# Patient Record
Sex: MALE | Race: WHITE | NOT HISPANIC OR LATINO | Employment: FULL TIME | URBAN - METROPOLITAN AREA
[De-identification: names, ages, dates, MRNs, and addresses within clinical notes are randomized per-mention and may not be internally consistent; named-entity substitution may affect disease eponyms.]

---

## 2017-01-26 RX ORDER — IBUPROFEN 800 MG/1
TABLET, FILM COATED ORAL
Qty: 90 TABLET | Refills: 3 | OUTPATIENT
Start: 2017-01-26

## 2017-01-27 RX ORDER — CARBAMAZEPINE 200 MG
TABLET ORAL
Qty: 720 TABLET | Refills: 0 | OUTPATIENT
Start: 2017-01-27

## 2017-01-27 RX ORDER — PRIMIDONE 250 MG/1
TABLET ORAL
Qty: 120 TABLET | Refills: 2 | OUTPATIENT
Start: 2017-01-27

## 2017-01-30 DIAGNOSIS — G40.209 PARTIAL SYMPTOMATIC EPILEPSY WITH COMPLEX PARTIAL SEIZURES, NOT INTRACTABLE, WITHOUT STATUS EPILEPTICUS (H): Primary | ICD-10-CM

## 2017-01-31 RX ORDER — PRIMIDONE 250 MG/1
TABLET ORAL
Qty: 120 TABLET | Refills: 2 | Status: SHIPPED | OUTPATIENT
Start: 2017-01-31 | End: 2017-04-29

## 2017-01-31 RX ORDER — CARBAMAZEPINE 200 MG/1
TABLET ORAL
Qty: 240 TABLET | Refills: 2 | Status: SHIPPED | OUTPATIENT
Start: 2017-01-31 | End: 2017-05-01

## 2017-02-02 RX ORDER — IBUPROFEN 800 MG/1
TABLET, FILM COATED ORAL
Qty: 90 TABLET | Refills: 3 | OUTPATIENT
Start: 2017-02-02

## 2017-04-28 DIAGNOSIS — G40.209 PARTIAL SYMPTOMATIC EPILEPSY WITH COMPLEX PARTIAL SEIZURES, NOT INTRACTABLE, WITHOUT STATUS EPILEPTICUS (H): ICD-10-CM

## 2017-04-28 RX ORDER — CARBAMAZEPINE 200 MG/1
TABLET ORAL
Qty: 240 TABLET | Refills: 2 | OUTPATIENT
Start: 2017-04-28

## 2017-04-29 DIAGNOSIS — G40.209 PARTIAL SYMPTOMATIC EPILEPSY WITH COMPLEX PARTIAL SEIZURES, NOT INTRACTABLE, WITHOUT STATUS EPILEPTICUS (H): ICD-10-CM

## 2017-05-01 RX ORDER — PRIMIDONE 250 MG/1
TABLET ORAL
Qty: 120 TABLET | Refills: 0 | Status: SHIPPED | OUTPATIENT
Start: 2017-05-01 | End: 2018-05-16

## 2017-05-01 RX ORDER — CARBAMAZEPINE 200 MG/1
TABLET ORAL
Qty: 240 TABLET | Refills: 0 | Status: SHIPPED | OUTPATIENT
Start: 2017-05-01 | End: 2017-05-18

## 2017-05-18 ENCOUNTER — OFFICE VISIT (OUTPATIENT)
Dept: NEUROLOGY | Facility: CLINIC | Age: 47
End: 2017-05-18

## 2017-05-18 VITALS — DIASTOLIC BLOOD PRESSURE: 70 MMHG | HEART RATE: 72 BPM | SYSTOLIC BLOOD PRESSURE: 110 MMHG

## 2017-05-18 DIAGNOSIS — G40.109 LOCALIZATION-RELATED EPILEPSY (H): Primary | ICD-10-CM

## 2017-05-18 DIAGNOSIS — G40.209 PARTIAL SYMPTOMATIC EPILEPSY WITH COMPLEX PARTIAL SEIZURES, NOT INTRACTABLE, WITHOUT STATUS EPILEPTICUS (H): ICD-10-CM

## 2017-05-18 DIAGNOSIS — G43.009 MIGRAINE WITHOUT AURA AND WITHOUT STATUS MIGRAINOSUS, NOT INTRACTABLE: ICD-10-CM

## 2017-05-18 RX ORDER — PRIMIDONE 250 MG/1
250 TABLET ORAL 4 TIMES DAILY
Qty: 360 TABLET | Refills: 3 | Status: SHIPPED | OUTPATIENT
Start: 2017-05-18 | End: 2018-04-25

## 2017-05-18 RX ORDER — NORTRIPTYLINE HCL 10 MG
40 CAPSULE ORAL AT BEDTIME
Qty: 360 CAPSULE | Refills: 3 | Status: SHIPPED | OUTPATIENT
Start: 2017-05-18 | End: 2018-05-16

## 2017-05-18 RX ORDER — CARBAMAZEPINE 200 MG/1
TABLET ORAL
Qty: 720 TABLET | Refills: 3 | Status: SHIPPED | OUTPATIENT
Start: 2017-05-18 | End: 2018-04-25

## 2017-05-18 NOTE — MR AVS SNAPSHOT
After Visit Summary   2017    Michele Lezama    MRN: [id number]           Patient Information     Date Of Birth          1970        Visit Information        Provider Department      2017 9:00 AM Nate Barraza MD [facility]        Today's Diagnoses     Localization-related epilepsy (H)    -  1    Partial symptomatic epilepsy with complex partial seizures, not intractable, without status epilepticus (H)        Migraine without aura and without status migrainosus, not intractable           Follow-ups after your visit        Follow-up notes from your care team     Return in about 1 year (around 2018).      Who to contact     Please call your clinic at [phone] to:    Ask questions about your health    Make or cancel appointments    Discuss your medicines    Learn about your test results    Speak to your doctor   If you have compliments or concerns about an experience at your clinic, or if you wish to file a complaint, please contact [facility] Patient Relations at [phone] or email us at [email or web address]         Additional Information About Your Visit        MyChart Information     [employer] is an electronic gateway that provides easy, online access to your medical records. With [employer], you can request a clinic appointment, read your test results, renew a prescription or communicate with your care team.     To sign up for [employer] visit the website at [email or web address]   You will be asked to enter the access code listed below, as well as some personal information. Please follow the directions to create your username and password.     Your access code is: [email or web address]  Expires: 2017  1:45 PM     Your access code will  in 90 days. If you need help or a new code, please contact your [facility] or call [phone] for assistance.         Care EveryWhere ID     This is your Care EveryWhere ID. This could be used by other organizations to access your [place] medical records  [id number]        Your Vitals Were     Pulse                   72            Blood Pressure from Last 3 Encounters:   05/18/17 110/70   11/30/15 120/84   09/22/14 118/84    Weight from Last 3 Encounters:   No data found for Wt              Today, you had the following     No orders found for display         Today's Medication Changes          These changes are accurate as of: 5/18/17 11:59 PM.  If you have any questions, ask your nurse or doctor.               Stop taking these medicines if you haven't already. Please contact your care team if you have questions.     SUMAtriptan 50 MG tablet   Commonly known as:  IMITREX                Where to get your medicines      These medications were sent to [facility]/pharmacy #[id number] - [place] - [address]  [address], [place] [address]     Phone:  [phone]     carBAMazepine 200 MG tablet    nortriptyline 10 MG capsule    primidone 250 MG tablet                Primary Care Provider Office Phone # Fax #    Sandeep ROSSANA Kinsey [phone]       [facility] [address] [place] [address]        Thank you!     Thank you for choosing [facility]  for your care. Our goal is always to provide you with excellent care. Hearing back from our patients is one way we can continue to improve our services. Please take a few minutes to complete the written survey that you may receive in the mail after your visit with us. Thank you!             Your Updated Medication List - Protect others around you: Learn how to safely use, store and throw away your medicines at www.disposemymeds.org.          This list is accurate as of: 5/18/17 11:59 PM.  Always use your most recent med list.                   Brand Name Dispense Instructions for use    carBAMazepine 200 MG  tablet    TEGretol    720 tablet    TAKE 2 TABLETS (400 MG) BY MOUTH 4 TIMES DAILY       ibuprofen 800 MG tablet    ADVIL/MOTRIN    90 tablet    TAKE 1 TABLET (800 MG) BY MOUTH 3 TIMES DAILY AS NEEDED       LISINOPRIL-HYDROCHLOROTHIAZIDE PO      Take 1 tablet by mouth daily       nortriptyline 10 MG capsule    PAMELOR    360 capsule    Take 4 capsules (40 mg) by mouth At Bedtime       * primidone 250 MG tablet    MYSOLINE    120 tablet    TAKE 1 TABLET (250 MG) BY MOUTH 4 TIMES DAILY       * primidone 250 MG tablet    MYSOLINE    360 tablet    Take 1 tablet (250 mg) by mouth 4 times daily       vitamin D 87829 UNIT capsule    ERGOCALCIFEROL     Take 1 capsule by mouth twice a week. Takes on Mon and Thursday       * Notice:  This list has 2 medication(s) that are the same as other medications prescribed for you. Read the directions carefully, and ask your doctor or other care provider to review them with you.

## 2017-05-18 NOTE — LETTER
5/18/2017       RE: Michele Lezama  [address] [facility] [address]     Dear Colleague,    Thank you for referring your patient, Michele Lezama, to the [facility] at [facility]. Please see a copy of my visit note below.    HISTORY OF PRESENT ILLNESS:  This patient is seen in followup with complex partial seizures and also headache.  I last saw the patient in 11/2015.  He is on a regimen of carbamazepine generic 200 mg 2 tablets 4 times a day for a total daily dose of 1600 mg and primidone 250 mg 4 times a day.  He has not had any seizures.  He did go on brand name in the past year.  This made him quite nervous because he had had seizures on generic formulations before.  However, he has not had a seizure now.  He also takes nortriptyline 40 mg at night for management of headache.  He takes ibuprofen 800 mg at night for headache and also he is on Excedrin Migraine about twice a month when he gets a breakthrough migraine.  He has had 2 knee surgeries since I last saw him.  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  He also has sleep apnea but cannot use a CPAP machine.  Overall, things are going well.  He does have a problem with his weight and knows that he needs to lose about 75 pounds.  He has made some dietary adjustments in this regard and is hoping that he is going to start shedding some of the weight.      PHYSICAL EXAMINATION:   VITAL SIGNS:   His blood pressure is 110/70.     NEUROLOGIC:   Visual acuity is 20/20 bilaterally.  Funduscopic exam shows sharp discs bilaterally.      He has had lab work performed through [facility].  His vitamin D level in March was 75.  Carbamazepine level in November was 9.1 and phenobarbital level was 24.  CBC, sodium and liver panel all normal.      ASSESSMENT:   1.  Complex partial seizures, well controlled with carbamazepine and primidone.   2.  Common  migraine and tension headache.      DISCUSSION:  The patient is seen with the above problem list.  He is doing well on his current regimen of carbamazepine, primidone and nortriptyline.  He was able to make the switch to generic anticonvulsants without having breakthrough seizures.  He is pleased in that regard.  He has no other concerns or complaints.  Things are going well.  I have given him refills on medicine for a year.  Labs should be rechecked at the end of the year, including drug levels, liver profile and CBC.  I will see him in followup in a year, or sooner if there are problems.      Nate Barraza MD      cc:   Sandeep Kinsey MD   [id number] [place] [address]              D: 2017 09:30   T: 2017 14:41   MT: AKA      Name:     KADI WOO   MRN:      [id number]        Account:      [id number]   :      1970           Service Date: 2017      Document: [id number]

## 2018-04-25 DIAGNOSIS — G40.109 LOCALIZATION-RELATED EPILEPSY (H): ICD-10-CM

## 2018-04-25 DIAGNOSIS — G40.209 PARTIAL SYMPTOMATIC EPILEPSY WITH COMPLEX PARTIAL SEIZURES, NOT INTRACTABLE, WITHOUT STATUS EPILEPTICUS (H): ICD-10-CM

## 2018-04-25 RX ORDER — PRIMIDONE 250 MG/1
TABLET ORAL
Qty: 120 TABLET | Refills: 0 | Status: SHIPPED | OUTPATIENT
Start: 2018-04-25 | End: 2019-05-15

## 2018-04-25 RX ORDER — CARBAMAZEPINE 200 MG/1
TABLET ORAL
Qty: 240 TABLET | Refills: 0 | Status: SHIPPED | OUTPATIENT
Start: 2018-04-25 | End: 2018-05-16

## 2018-05-16 ENCOUNTER — OFFICE VISIT (OUTPATIENT)
Dept: NEUROLOGY | Facility: CLINIC | Age: 48
End: 2018-05-16
Payer: COMMERCIAL

## 2018-05-16 VITALS — OXYGEN SATURATION: 95 % | HEART RATE: 81 BPM | DIASTOLIC BLOOD PRESSURE: 82 MMHG | SYSTOLIC BLOOD PRESSURE: 127 MMHG

## 2018-05-16 DIAGNOSIS — G43.009 MIGRAINE WITHOUT AURA AND WITHOUT STATUS MIGRAINOSUS, NOT INTRACTABLE: Primary | ICD-10-CM

## 2018-05-16 DIAGNOSIS — G40.209 PARTIAL SYMPTOMATIC EPILEPSY WITH COMPLEX PARTIAL SEIZURES, NOT INTRACTABLE, WITHOUT STATUS EPILEPTICUS (H): ICD-10-CM

## 2018-05-16 PROCEDURE — 99214 OFFICE O/P EST MOD 30 MIN: CPT | Performed by: PSYCHIATRY & NEUROLOGY

## 2018-05-16 RX ORDER — PRIMIDONE 250 MG/1
TABLET ORAL
Qty: 360 TABLET | Refills: 3 | Status: SHIPPED | OUTPATIENT
Start: 2018-05-16 | End: 2019-05-15

## 2018-05-16 RX ORDER — CARBAMAZEPINE 200 MG/1
TABLET ORAL
Qty: 720 TABLET | Refills: 3 | Status: SHIPPED | OUTPATIENT
Start: 2018-05-16 | End: 2019-05-15

## 2018-05-16 RX ORDER — TOPIRAMATE SPINKLE 25 MG/1
25 CAPSULE ORAL SEE ADMIN INSTRUCTIONS
Qty: 90 CAPSULE | Refills: 1 | Status: SHIPPED | OUTPATIENT
Start: 2018-05-16 | End: 2019-08-20

## 2018-05-16 NOTE — MR AVS SNAPSHOT
After Visit Summary   5/16/2018    Michele Lezama    MRN: [id number]           Patient Information     Date Of Birth          1970        Visit Information        Provider Department      5/16/2018 4:00 PM Nate Barraza MD [facility]        Today's Diagnoses     Migraine without aura and without status migrainosus, not intractable    -  1    Partial symptomatic epilepsy with complex partial seizures, not intractable, without status epilepticus (H)           Follow-ups after your visit        Follow-up notes from your care team     Return in about 1 year (around 5/16/2019).      Your next 10 appointments already scheduled     May 15, 2019 11:00 AM CDT   Return Visit with Nate Barraza MD   [facility]    [address] [place] [address] [phone]              Who to contact     If you have questions or need follow up information about today's clinic visit or your schedule please contact [facility] directly at [phone].  Normal or non-critical lab and imaging results will be communicated to you by [employer], letter or phone within 4 business days after the clinic has received the results. If you do not hear from us within 7 days, please contact the clinic through [employer] or phone. If you have a critical or abnormal lab result, we will notify you by phone as soon as possible.  Submit refill requests through [employer] or call your pharmacy and they will forward the refill request to us. Please allow 3 business days for your refill to be completed.          Additional Information About Your Visit        [employer] Information     [employer] is an electronic gateway that provides easy, online access to your medical records. With [employer], you can request a clinic appointment, read your test results, renew a prescription or communicate with your care team.     To sign up for [employer] visit the  website at [email or web address]   You will be asked to enter the access code listed below, as well as some personal information. Please follow the directions to create your username and password.     Your access code is: [email or web address]  Expires: 2018  5:01 PM     Your access code will  in 90 days. If you need help or a new code, please contact your [facility] or call [phone] for assistance.        Care EveryWhere ID     This is your Care EveryWhere ID. This could be used by other organizations to access your [place] medical records  [id number]        Your Vitals Were     Pulse Pulse Oximetry                81 95%           Blood Pressure from Last 3 Encounters:   18 127/82   17 110/70   11/30/15 120/84    Weight from Last 3 Encounters:   No data found for Wt              Today, you had the following     No orders found for display         Today's Medication Changes          These changes are accurate as of 18  5:01 PM.  If you have any questions, ask your nurse or doctor.               Start taking these medicines.        Dose/Directions    topiramate 25 MG capsule   Commonly known as:  TOPAMAX   Used for:  Migraine without aura and without status migrainosus, not intractable        Dose:  25 mg   Take 1 capsule (25 mg) by mouth See Admin Instructions One po q hs for 3 days, then two po q hs for 3 days, then 3 po q hs.   Quantity:  90 capsule   Refills:  1         These medicines have changed or have updated prescriptions.        Dose/Directions    carBAMazepine 200 MG tablet   Commonly known as:  TEGretol   This may have changed:  See the new instructions.   Used for:  Partial symptomatic epilepsy with complex partial seizures, not intractable, without status epilepticus (H)        TAKE 2 TABLETS (400 MG) BY MOUTH 4 TIMES DAILY   Quantity:  720 tablet   Refills:  3       * primidone 250 MG tablet   Commonly known as:  MYSOLINE   This may have  changed:  Another medication with the same name was changed. Make sure you understand how and when to take each.   Used for:  Localization-related epilepsy (H)        TAKE 1 TABLET (250 MG) BY MOUTH 4 TIMES DAILY   Quantity:  120 tablet   Refills:  0       * primidone 250 MG tablet   Commonly known as:  MYSOLINE   This may have changed:  See the new instructions.   Used for:  Partial symptomatic epilepsy with complex partial seizures, not intractable, without status epilepticus (H)        TAKE 1 TABLET (250 MG) BY MOUTH 4 TIMES DAILY   Quantity:  360 tablet   Refills:  3       * Notice:  This list has 2 medication(s) that are the same as other medications prescribed for you. Read the directions carefully, and ask your doctor or other care provider to review them with you.      Stop taking these medicines if you haven't already. Please contact your care team if you have questions.     nortriptyline 10 MG capsule   Commonly known as:  PAMELOR                Where to get your medicines      These medications were sent to [facility]/pharmacy #[id number] - [place], [place] - [address]  [address] [facility] [address]     Phone:  [phone]     carBAMazepine 200 MG tablet    primidone 250 MG tablet    topiramate 25 MG capsule                Primary Care Provider Office Phone # Fax #    Sandeep ROSSANA Kinsey [phone]       [facility] [address] [address]        Equal Access to Services     [facility] GAYATRI : Hadii veronique garciao Sosilverio, waaxda luqadaha, qaybta kaalmada adeegyada, fazal rossi . [facility] [phone].    ATENCIÓN: Si habla español, tiene a anne disposición servicios gratuitos de asistencia lingüística. Llame al [phone].    We comply with applicable federal civil rights laws and [place] laws. We do not discriminate on the basis of race, color, national origin, age, disability, sex, sexual orientation, or gender identity.            Thank  you!     Thank you for choosing [facility]  for your care. Our goal is always to provide you with excellent care. Hearing back from our patients is one way we can continue to improve our services. Please take a few minutes to complete the written survey that you may receive in the mail after your visit with us. Thank you!             Your Updated Medication List - Protect others around you: Learn how to safely use, store and throw away your medicines at www.disposemymeds.org.          This list is accurate as of 5/16/18  5:01 PM.  Always use your most recent med list.                   Brand Name Dispense Instructions for use Diagnosis    carBAMazepine 200 MG tablet    TEGretol    720 tablet    TAKE 2 TABLETS (400 MG) BY MOUTH 4 TIMES DAILY    Partial symptomatic epilepsy with complex partial seizures, not intractable, without status epilepticus (H)       ibuprofen 800 MG tablet    ADVIL/MOTRIN    90 tablet    TAKE 1 TABLET (800 MG) BY MOUTH 3 TIMES DAILY AS NEEDED    Migraine with aura and without status migrainosus, not intractable       LISINOPRIL-HYDROCHLOROTHIAZIDE PO      Take 1 tablet by mouth daily        * primidone 250 MG tablet    MYSOLINE    120 tablet    TAKE 1 TABLET (250 MG) BY MOUTH 4 TIMES DAILY    Localization-related epilepsy (H)       * primidone 250 MG tablet    MYSOLINE    360 tablet    TAKE 1 TABLET (250 MG) BY MOUTH 4 TIMES DAILY    Partial symptomatic epilepsy with complex partial seizures, not intractable, without status epilepticus (H)       topiramate 25 MG capsule    TOPAMAX    90 capsule    Take 1 capsule (25 mg) by mouth See Admin Instructions One po q hs for 3 days, then two po q hs for 3 days, then 3 po q hs.    Migraine without aura and without status migrainosus, not intractable       vitamin D 64691 UNIT capsule    ERGOCALCIFEROL     Take 1 capsule by mouth twice a week. Takes on Mon and Thursday        * Notice:  This list has 2 medication(s) that are the same  as other medications prescribed for you. Read the directions carefully, and ask your doctor or other care provider to review them with you.
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
## 2018-05-23 RX ORDER — PRIMIDONE 250 MG/1
TABLET ORAL
Qty: 120 TABLET | Refills: 0 | OUTPATIENT
Start: 2018-05-23

## 2019-05-15 ENCOUNTER — OFFICE VISIT (OUTPATIENT)
Dept: NEUROLOGY | Facility: CLINIC | Age: 49
End: 2019-05-15
Payer: COMMERCIAL

## 2019-05-15 VITALS
HEART RATE: 93 BPM | WEIGHT: 313.8 LBS | SYSTOLIC BLOOD PRESSURE: 138 MMHG | OXYGEN SATURATION: 97 % | DIASTOLIC BLOOD PRESSURE: 89 MMHG

## 2019-05-15 DIAGNOSIS — G40.209 PARTIAL SYMPTOMATIC EPILEPSY WITH COMPLEX PARTIAL SEIZURES, NOT INTRACTABLE, WITHOUT STATUS EPILEPTICUS (H): ICD-10-CM

## 2019-05-15 DIAGNOSIS — G44.209 TENSION HEADACHE: Primary | ICD-10-CM

## 2019-05-15 PROCEDURE — 99213 OFFICE O/P EST LOW 20 MIN: CPT | Performed by: PSYCHIATRY & NEUROLOGY

## 2019-05-15 RX ORDER — LISINOPRIL/HYDROCHLOROTHIAZIDE 10-12.5 MG
1 TABLET ORAL DAILY
COMMUNITY
Start: 2019-01-16

## 2019-05-15 RX ORDER — CARBAMAZEPINE 200 MG/1
TABLET ORAL
Qty: 720 TABLET | Refills: 3 | Status: SHIPPED | OUTPATIENT
Start: 2019-05-15 | End: 2020-05-20

## 2019-05-15 RX ORDER — NORTRIPTYLINE HCL 25 MG
25 CAPSULE ORAL AT BEDTIME
COMMUNITY
End: 2019-05-15

## 2019-05-15 RX ORDER — PRIMIDONE 250 MG/1
TABLET ORAL
Qty: 360 TABLET | Refills: 3 | Status: SHIPPED | OUTPATIENT
Start: 2019-05-15 | End: 2020-05-20

## 2019-05-15 RX ORDER — NORTRIPTYLINE HCL 25 MG
50 CAPSULE ORAL AT BEDTIME
Qty: 180 CAPSULE | Refills: 3 | Status: SHIPPED | OUTPATIENT
Start: 2019-05-15 | End: 2021-06-04

## 2019-05-15 ASSESSMENT — PAIN SCALES - GENERAL: PAINLEVEL: NO PAIN (0)
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
## 2019-05-15 NOTE — NURSING NOTE
Michele Lezama's goals for this visit include: return  He requests these members of his care team be copied on today's visit information:     PCP: Sandeep Kinsey    Referring Provider:  No referring provider defined for this encounter.    /89   Pulse 93   Wt 142.3 kg (313 lb 12.8 oz)   SpO2 97%     Do you need any medication refills at today's visit? ?
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
## 2019-07-09 ENCOUNTER — TRANSFERRED RECORDS (OUTPATIENT)
Dept: HEALTH INFORMATION MANAGEMENT | Facility: CLINIC | Age: 49
End: 2019-07-09

## 2019-08-15 ENCOUNTER — TRANSFERRED RECORDS (OUTPATIENT)
Dept: HEALTH INFORMATION MANAGEMENT | Facility: CLINIC | Age: 49
End: 2019-08-15

## 2019-08-20 ENCOUNTER — OFFICE VISIT (OUTPATIENT)
Dept: NEUROLOGY | Facility: CLINIC | Age: 49
End: 2019-08-20
Payer: COMMERCIAL

## 2019-08-20 VITALS
SYSTOLIC BLOOD PRESSURE: 133 MMHG | WEIGHT: 315 LBS | OXYGEN SATURATION: 98 % | DIASTOLIC BLOOD PRESSURE: 91 MMHG | HEART RATE: 84 BPM | BODY MASS INDEX: 46.65 KG/M2 | HEIGHT: 69 IN

## 2019-08-20 DIAGNOSIS — G40.909 SEIZURE DISORDER (H): ICD-10-CM

## 2019-08-20 DIAGNOSIS — R42 DIZZINESS: Primary | ICD-10-CM

## 2019-08-20 PROCEDURE — 99214 OFFICE O/P EST MOD 30 MIN: CPT | Performed by: PSYCHIATRY & NEUROLOGY

## 2019-08-20 ASSESSMENT — MIFFLIN-ST. JEOR: SCORE: 2332.32

## 2019-08-20 ASSESSMENT — PAIN SCALES - GENERAL: PAINLEVEL: NO PAIN (0)

## 2019-08-20 NOTE — NURSING NOTE
"Michele Lezama's goals for this visit include: return  He requests these members of his care team be copied on today's visit information:     PCP: Sandeep Kinsey    Referring Provider:  Referred Self, MD  No address on file    BP (!) 133/91   Pulse 84   Ht 1.753 m (5' 9\")   Wt 146.2 kg (322 lb 4.8 oz)   SpO2 98%   BMI 47.60 kg/m      Do you need any medication refills at today's visit? y  "
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
## 2020-03-04 ENCOUNTER — TELEPHONE (OUTPATIENT)
Dept: NEUROLOGY | Facility: CLINIC | Age: 50
End: 2020-03-04

## 2020-03-04 NOTE — TELEPHONE ENCOUNTER
Pt had the following labs done on 2/20/2020 at [facility]:  Carbamazepine, Primidone level and Comprehensive metabolic panel     These results are available in care everywhere.    Disha Crawford LPN

## 2020-05-06 ENCOUNTER — TELEPHONE (OUTPATIENT)
Dept: NEUROLOGY | Facility: CLINIC | Age: 50
End: 2020-05-06

## 2020-05-20 ENCOUNTER — VIRTUAL VISIT (OUTPATIENT)
Dept: NEUROLOGY | Facility: CLINIC | Age: 50
End: 2020-05-20
Payer: COMMERCIAL

## 2020-05-20 DIAGNOSIS — G40.209 PARTIAL SYMPTOMATIC EPILEPSY WITH COMPLEX PARTIAL SEIZURES, NOT INTRACTABLE, WITHOUT STATUS EPILEPTICUS (H): ICD-10-CM

## 2020-05-20 PROCEDURE — 99213 OFFICE O/P EST LOW 20 MIN: CPT | Mod: TEL | Performed by: PSYCHIATRY & NEUROLOGY

## 2020-05-20 RX ORDER — CARBAMAZEPINE 200 MG/1
TABLET ORAL
Qty: 720 TABLET | Refills: 3 | Status: SHIPPED | OUTPATIENT
Start: 2020-05-20 | End: 2021-06-04

## 2020-05-20 RX ORDER — PRIMIDONE 250 MG/1
TABLET ORAL
Qty: 360 TABLET | Refills: 3 | Status: SHIPPED | OUTPATIENT
Start: 2020-05-20 | End: 2021-06-04

## 2020-05-20 NOTE — PROGRESS NOTES
Visit Date:   2020      This is a telephone followup visit because of the viral epidemic.  Call initiated time 8:03, call terminated time 8:18.  The patient interviewed at work.      The patient was last seen in 2019.  He has a history of seizures that are well controlled with carbamazepine and primidone.  He has not had any seizures.  His medicines are primidone 250 mg 4 times daily and carbamazepine 400 mg 4 times daily.  He did have lab work in this regard.  A CBC in March was normal.  He had other labs drawn in February.  Carbamazepine 10.6, phenobarbital 27, primidone 9.1.  A liver profile was normal.  A vitamin D level was 29.      In addition, he had been on nortriptyline for migraine prevention.      He did not find it very effective.  He has now gone on a monthly injection, Emgality and that has worked quite well.  He is not having the headaches at all.      He reports that his schedule has changed.  Now he is working days, not midnight shift.  This works better for dosing his medication.  He is doing well.  He has no concerns.  His balance is good.      There is no exam as this is a telephone followup visit.      ASSESSMENT:   1.  Seizure disorder, well controlled with primidone and carbamazepine.   2.  Migraine.      DISCUSSION:  The patient has the above issues.  I gave him 1 year refills on carbamazepine and primidone.  He should have labs checked at least on an annual basis including CBC, liver profile and drug levels.  I have no further recommendations for him at this time.  I can see him in followup on an as-needed basis.  Otherwise, he should follow up in clinic in 1 year.         LAUREN CARVER MD             D: 2020   T: 2020   MT: NATALIIA      Name:     KADI WOO   MRN:      [id number]        Account:      [id number]   :      1970           Visit Date:   2020      Document: [id number]

## 2020-09-24 ENCOUNTER — AMBULATORY - HEALTHEAST (OUTPATIENT)
Dept: SURGERY | Facility: CLINIC | Age: 50
End: 2020-09-24

## 2020-09-24 DIAGNOSIS — Z11.59 ENCOUNTER FOR SCREENING FOR OTHER VIRAL DISEASES: ICD-10-CM

## 2020-10-28 ASSESSMENT — MIFFLIN-ST. JEOR: SCORE: 2334.57

## 2020-10-31 ENCOUNTER — AMBULATORY - HEALTHEAST (OUTPATIENT)
Dept: FAMILY MEDICINE | Facility: CLINIC | Age: 50
End: 2020-10-31

## 2020-10-31 DIAGNOSIS — Z11.59 ENCOUNTER FOR SCREENING FOR OTHER VIRAL DISEASES: ICD-10-CM

## 2020-11-03 ENCOUNTER — COMMUNICATION - HEALTHEAST (OUTPATIENT)
Dept: SCHEDULING | Facility: CLINIC | Age: 50
End: 2020-11-03

## 2020-11-04 ENCOUNTER — ANESTHESIA - HEALTHEAST (OUTPATIENT)
Dept: SURGERY | Facility: CLINIC | Age: 50
End: 2020-11-04

## 2020-11-04 ENCOUNTER — SURGERY - HEALTHEAST (OUTPATIENT)
Dept: SURGERY | Facility: CLINIC | Age: 50
End: 2020-11-04

## 2020-11-04 ASSESSMENT — MIFFLIN-ST. JEOR
SCORE: 2334.57
SCORE: 2334.57

## 2020-11-19 ENCOUNTER — RECORDS - HEALTHEAST (OUTPATIENT)
Dept: ADMINISTRATIVE | Facility: OTHER | Age: 50
End: 2020-11-19

## 2020-11-19 ENCOUNTER — HOSPITAL ENCOUNTER (OUTPATIENT)
Dept: ULTRASOUND IMAGING | Facility: CLINIC | Age: 50
Discharge: HOME | End: 2020-11-19
Attending: PHYSICIAN ASSISTANT

## 2020-11-19 DIAGNOSIS — O22.30 DVT (DEEP VEIN THROMBOSIS) IN PREGNANCY: ICD-10-CM

## 2020-12-20 ENCOUNTER — HEALTH MAINTENANCE LETTER (OUTPATIENT)
Age: 50
End: 2020-12-20

## 2021-05-13 ENCOUNTER — AMBULATORY - HEALTHEAST (OUTPATIENT)
Dept: SURGERY | Facility: CLINIC | Age: 51
End: 2021-05-13

## 2021-05-13 DIAGNOSIS — Z11.59 ENCOUNTER FOR SCREENING FOR OTHER VIRAL DISEASES: ICD-10-CM

## 2021-06-04 ENCOUNTER — OFFICE VISIT (OUTPATIENT)
Dept: NEUROLOGY | Facility: CLINIC | Age: 51
End: 2021-06-04
Payer: COMMERCIAL

## 2021-06-04 VITALS
WEIGHT: 315 LBS | HEART RATE: 76 BPM | DIASTOLIC BLOOD PRESSURE: 85 MMHG | SYSTOLIC BLOOD PRESSURE: 125 MMHG | HEIGHT: 69 IN | BODY MASS INDEX: 46.65 KG/M2

## 2021-06-04 DIAGNOSIS — G40.209 PARTIAL SYMPTOMATIC EPILEPSY WITH COMPLEX PARTIAL SEIZURES, NOT INTRACTABLE, WITHOUT STATUS EPILEPTICUS (H): ICD-10-CM

## 2021-06-04 PROCEDURE — 99215 OFFICE O/P EST HI 40 MIN: CPT | Performed by: PSYCHIATRY & NEUROLOGY

## 2021-06-04 RX ORDER — PRIMIDONE 250 MG/1
TABLET ORAL
Qty: 360 TABLET | Refills: 3 | Status: SHIPPED | OUTPATIENT
Start: 2021-06-04 | End: 2023-03-24

## 2021-06-04 RX ORDER — CARBAMAZEPINE 200 MG/1
TABLET ORAL
Qty: 720 TABLET | Refills: 3 | Status: SHIPPED | OUTPATIENT
Start: 2021-06-04 | End: 2023-03-24

## 2021-06-04 ASSESSMENT — MIFFLIN-ST. JEOR: SCORE: 2307.35

## 2021-06-04 ASSESSMENT — PAIN SCALES - GENERAL: PAINLEVEL: MILD PAIN (3)
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
## 2021-06-05 VITALS — WEIGHT: 315 LBS | BODY MASS INDEX: 46.65 KG/M2 | HEIGHT: 69 IN

## 2021-06-12 NOTE — ANESTHESIA PREPROCEDURE EVALUATION
Anesthesia Evaluation      Patient summary reviewed   No history of anesthetic complications     Airway   Mallampati: II  Neck ROM: full   Pulmonary - normal exam   (+) sleep apnea,                          Cardiovascular - normal exam  (+) hypertension, ,      Neuro/Psych      Endo/Other    (+) obesity,      GI/Hepatic/Renal    (+) GERD,             Dental - normal exam                        Anesthesia Plan  Planned anesthetic: spinal and peripheral nerve block    ASA 3     Anesthetic plan and risks discussed with: patient  Anesthesia plan special considerations: antiemetics,   Post-op plan: routine recovery

## 2021-06-12 NOTE — ANESTHESIA POSTPROCEDURE EVALUATION
Patient: Michele Lezama  Procedure(s):  TOTAL KNEE ARTHROPLASTY (Right)  Anesthesia type: spinal    Patient location: PACU  Last vitals:   Vitals Value Taken Time   /67 11/04/20 1929   Temp 36.3  C (97.4  F) 11/04/20 1929   Pulse 97 11/04/20 1929   Resp 20 11/04/20 1929   SpO2 92 % 11/04/20 2003     Post vital signs: stable  Level of consciousness: awake and responds to simple questions  Post-anesthesia pain: pain controlled  Post-anesthesia nausea and vomiting: no  Pulmonary: unassisted, return to baseline  Cardiovascular: stable and blood pressure at baseline  Hydration: adequate  Anesthetic events: no    QCDR Measures:  ASA# 11 - Helene-op Cardiac Arrest: ASA11B - Patient did NOT experience unanticipated cardiac arrest  ASA# 12 - Helene-op Mortality Rate: ASA12B - Patient did NOT die  ASA# 13 - PACU Re-Intubation Rate: NA - No ETT / LMA used for case  ASA# 10 - Composite Anes Safety: ASA10A - No serious adverse event    Additional Notes:

## 2021-06-12 NOTE — ANESTHESIA PROCEDURE NOTES
Spinal Block    Patient location during procedure: OR  Start time: 11/4/2020 12:01 PM  End time: 11/4/2020 12:05 PM  Reason for block: at surgeon's request and primary anesthetic    Staffing:  Performing  Anesthesiologist: Vick Desai MD    Preanesthetic Checklist  Completed: patient identified, risks, benefits, and alternatives discussed, timeout performed, consent obtained, at patient's request, airway assessed, oxygen available, suction available, emergency drugs available and hand hygiene performed  Spinal Block  Patient position: sitting  Prep: ChloraPrep  Patient monitoring: heart rate, cardiac monitor, continuous pulse ox and blood pressure  Approach: midline  Location: L3-4  Injection technique: single-shot  Needle type: pencil-tip   Needle gauge: 22 G (long needle, introducers with inability to sit in the intraspinous ligament)

## 2021-06-12 NOTE — ANESTHESIA CARE TRANSFER NOTE
Last vitals:   Vitals:    11/04/20 1331   BP: 103/59   Pulse: 60   Resp: 16   Temp: 36.5  C (97.7  F)   SpO2: 97%     Patient's level of consciousness is drowsy  Spontaneous respirations: yes  Maintains airway independently: yes  Dentition unchanged: yes  Oropharynx: oropharynx clear of all foreign objects    QCDR Measures:  ASA# 20 - Surgical Safety Checklist: WHO surgical safety checklist completed prior to induction    PQRS# 430 - Adult PONV Prevention: 4558F - Pt received => 2 anti-emetic agents (different classes) preop & intraop  ASA# 8 - Peds PONV Prevention: NA - Not pediatric patient, not GA or 2 or more risk factors NOT present  PQRS# 424 - Helene-op Temp Management: 4559F - At least one body temp DOCUMENTED => 35.5C or 95.9F within required timeframe  PQRS# 426 - PACU Transfer Protocol: - Transfer of care checklist used  ASA# 14 - Acute Post-op Pain: ASA14B - Patient did NOT experience pain >= 7 out of 10

## 2021-06-27 DIAGNOSIS — Z11.59 ENCOUNTER FOR SCREENING FOR OTHER VIRAL DISEASES: ICD-10-CM

## 2021-07-21 ASSESSMENT — MIFFLIN-ST. JEOR: SCORE: 2289.21

## 2021-07-21 NOTE — PROVIDER NOTIFICATION
Discharge plan according to [facility]:       07/21/21 0944   Discharge Planning   Patient/Family Anticipates Transition to home   Living Arrangements   People in home spouse   Is your private residence a single family home or apartment? Single family home   Number of Stairs, Within Home, Primary 8   Stair Railings, Within Home, Primary railings safe and in good condition   Bathroom Shower/Tub Tub/Shower unit   Equipment Currently Used at Home none   Support System   Support Systems Spouse/Significant Other   Do you have someone available to stay with you one or two nights once you are home? Yes   Medical Clearance   Clinic Name [facility]

## 2021-07-26 ENCOUNTER — HOSPITAL ENCOUNTER (OUTPATIENT)
Facility: CLINIC | Age: 51
LOS: 1 days | Discharge: HOME | End: 2021-07-26
Attending: ORTHOPAEDIC SURGERY | Admitting: ORTHOPAEDIC SURGERY
Payer: COMMERCIAL

## 2021-07-26 ENCOUNTER — ANESTHESIA EVENT (OUTPATIENT)
Dept: SURGERY | Facility: CLINIC | Age: 51
End: 2021-07-26
Payer: COMMERCIAL

## 2021-07-26 ENCOUNTER — APPOINTMENT (OUTPATIENT)
Dept: RADIOLOGY | Facility: CLINIC | Age: 51
End: 2021-07-26
Attending: PHYSICIAN ASSISTANT
Payer: COMMERCIAL

## 2021-07-26 ENCOUNTER — ANCILLARY PROCEDURE (OUTPATIENT)
Dept: ULTRASOUND IMAGING | Facility: CLINIC | Age: 51
End: 2021-07-26
Attending: ANESTHESIOLOGY
Payer: COMMERCIAL

## 2021-07-26 ENCOUNTER — ANESTHESIA (OUTPATIENT)
Dept: SURGERY | Facility: CLINIC | Age: 51
End: 2021-07-26
Payer: COMMERCIAL

## 2021-07-26 VITALS
OXYGEN SATURATION: 95 % | HEIGHT: 69 IN | RESPIRATION RATE: 16 BRPM | TEMPERATURE: 97.5 F | WEIGHT: 313.9 LBS | SYSTOLIC BLOOD PRESSURE: 130 MMHG | BODY MASS INDEX: 46.49 KG/M2 | DIASTOLIC BLOOD PRESSURE: 70 MMHG | HEART RATE: 93 BPM

## 2021-07-26 DIAGNOSIS — Z98.890 STATUS POST KNEE SURGERY: ICD-10-CM

## 2021-07-26 DIAGNOSIS — M17.12 PRIMARY OSTEOARTHRITIS OF LEFT KNEE: Primary | ICD-10-CM

## 2021-07-26 PROBLEM — E66.01 MORBID OBESITY (H): Status: ACTIVE | Noted: 2019-08-20

## 2021-07-26 PROBLEM — I10 ESSENTIAL HYPERTENSION: Status: ACTIVE | Noted: 2021-07-26

## 2021-07-26 PROBLEM — Z96.652 HISTORY OF TOTAL KNEE ARTHROPLASTY, LEFT: Status: ACTIVE | Noted: 2021-07-26

## 2021-07-26 PROCEDURE — 360N000077 HC SURGERY LEVEL 4, PER MIN: Performed by: ORTHOPAEDIC SURGERY

## 2021-07-26 PROCEDURE — C1776 JOINT DEVICE (IMPLANTABLE): HCPCS | Performed by: ORTHOPAEDIC SURGERY

## 2021-07-26 PROCEDURE — 99207 PR CONSULT E&M CHANGED TO INITIAL LEVEL: CPT | Performed by: HOSPITALIST

## 2021-07-26 PROCEDURE — 250N000011 HC RX IP 250 OP 636: Performed by: ANESTHESIOLOGY

## 2021-07-26 PROCEDURE — 250N000009 HC RX 250: Performed by: NURSE ANESTHETIST, CERTIFIED REGISTERED

## 2021-07-26 PROCEDURE — 258N000003 HC RX IP 258 OP 636: Performed by: ANESTHESIOLOGY

## 2021-07-26 PROCEDURE — 250N000011 HC RX IP 250 OP 636: Performed by: PHYSICIAN ASSISTANT

## 2021-07-26 PROCEDURE — 99204 OFFICE O/P NEW MOD 45 MIN: CPT | Performed by: HOSPITALIST

## 2021-07-26 PROCEDURE — 250N000013 HC RX MED GY IP 250 OP 250 PS 637: Performed by: HOSPITALIST

## 2021-07-26 PROCEDURE — 250N000011 HC RX IP 250 OP 636: Performed by: NURSE ANESTHETIST, CERTIFIED REGISTERED

## 2021-07-26 PROCEDURE — 250N000013 HC RX MED GY IP 250 OP 250 PS 637: Performed by: PHYSICIAN ASSISTANT

## 2021-07-26 PROCEDURE — 250N000009 HC RX 250: Performed by: ORTHOPAEDIC SURGERY

## 2021-07-26 PROCEDURE — 999N000065 XR KNEE PORT LEFT 1/2 VIEWS: Mod: LT

## 2021-07-26 PROCEDURE — 710N000010 HC RECOVERY PHASE 1, LEVEL 2, PER MIN: Performed by: ORTHOPAEDIC SURGERY

## 2021-07-26 PROCEDURE — 258N000001 HC RX 258: Performed by: ORTHOPAEDIC SURGERY

## 2021-07-26 PROCEDURE — 250N000009 HC RX 250: Performed by: PHYSICIAN ASSISTANT

## 2021-07-26 PROCEDURE — 258N000003 HC RX IP 258 OP 636: Performed by: NURSE ANESTHETIST, CERTIFIED REGISTERED

## 2021-07-26 PROCEDURE — 999N000141 HC STATISTIC PRE-PROCEDURE NURSING ASSESSMENT: Performed by: ORTHOPAEDIC SURGERY

## 2021-07-26 PROCEDURE — 272N000001 HC OR GENERAL SUPPLY STERILE: Performed by: ORTHOPAEDIC SURGERY

## 2021-07-26 PROCEDURE — 250N000009 HC RX 250: Performed by: ANESTHESIOLOGY

## 2021-07-26 PROCEDURE — 370N000017 HC ANESTHESIA TECHNICAL FEE, PER MIN: Performed by: ORTHOPAEDIC SURGERY

## 2021-07-26 DEVICE — CRUCIATE RETAINING FEMORAL
Type: IMPLANTABLE DEVICE | Site: KNEE | Status: FUNCTIONAL
Brand: TRIATHLON

## 2021-07-26 DEVICE — TIBIAL BEARING INSERT - CS
Type: IMPLANTABLE DEVICE | Site: KNEE | Status: FUNCTIONAL
Brand: TRIATHLON

## 2021-07-26 DEVICE — TIBIAL COMPONENT
Type: IMPLANTABLE DEVICE | Site: KNEE | Status: FUNCTIONAL
Brand: TRIATHLON

## 2021-07-26 RX ORDER — TRANEXAMIC ACID 650 MG/1
1950 TABLET ORAL ONCE
Status: COMPLETED | OUTPATIENT
Start: 2021-07-26 | End: 2021-07-26

## 2021-07-26 RX ORDER — NALOXONE HYDROCHLORIDE 0.4 MG/ML
0.4 INJECTION, SOLUTION INTRAMUSCULAR; INTRAVENOUS; SUBCUTANEOUS
Status: DISCONTINUED | OUTPATIENT
Start: 2021-07-26 | End: 2021-07-26 | Stop reason: HOSPADM

## 2021-07-26 RX ORDER — MAGNESIUM SULFATE 4 G/50ML
4 INJECTION INTRAVENOUS ONCE
Status: COMPLETED | OUTPATIENT
Start: 2021-07-26 | End: 2021-07-26

## 2021-07-26 RX ORDER — ONDANSETRON 4 MG/1
4 TABLET, ORALLY DISINTEGRATING ORAL EVERY 6 HOURS PRN
Status: DISCONTINUED | OUTPATIENT
Start: 2021-07-26 | End: 2021-07-26 | Stop reason: HOSPADM

## 2021-07-26 RX ORDER — CEFAZOLIN SODIUM IN 0.9 % NACL 3 G/100 ML
3 INTRAVENOUS SOLUTION, PIGGYBACK (ML) INTRAVENOUS
Status: COMPLETED | OUTPATIENT
Start: 2021-07-26 | End: 2021-07-26

## 2021-07-26 RX ORDER — KETOROLAC TROMETHAMINE 30 MG/ML
15 INJECTION, SOLUTION INTRAMUSCULAR; INTRAVENOUS EVERY 6 HOURS
Status: DISCONTINUED | OUTPATIENT
Start: 2021-07-26 | End: 2021-07-26 | Stop reason: HOSPADM

## 2021-07-26 RX ORDER — ONDANSETRON 4 MG/1
4 TABLET, ORALLY DISINTEGRATING ORAL EVERY 30 MIN PRN
Status: DISCONTINUED | OUTPATIENT
Start: 2021-07-26 | End: 2021-07-26 | Stop reason: HOSPADM

## 2021-07-26 RX ORDER — BUPIVACAINE HYDROCHLORIDE AND EPINEPHRINE 5; 5 MG/ML; UG/ML
INJECTION, SOLUTION PERINEURAL
Status: COMPLETED | OUTPATIENT
Start: 2021-07-26 | End: 2021-07-26

## 2021-07-26 RX ORDER — PROPOFOL 10 MG/ML
INJECTION, EMULSION INTRAVENOUS CONTINUOUS PRN
Status: DISCONTINUED | OUTPATIENT
Start: 2021-07-26 | End: 2021-07-26

## 2021-07-26 RX ORDER — HYDROXYZINE HYDROCHLORIDE 25 MG/1
50 TABLET, FILM COATED ORAL EVERY 6 HOURS PRN
Qty: 90 TABLET | Refills: 0 | Status: SHIPPED | OUTPATIENT
Start: 2021-07-26 | End: 2023-03-28

## 2021-07-26 RX ORDER — CARBAMAZEPINE 200 MG/1
400 TABLET ORAL 4 TIMES DAILY
Status: DISCONTINUED | OUTPATIENT
Start: 2021-07-26 | End: 2021-07-26 | Stop reason: HOSPADM

## 2021-07-26 RX ORDER — CEFADROXIL 500 MG/1
500 CAPSULE ORAL 2 TIMES DAILY
Qty: 14 CAPSULE | Refills: 0 | Status: SHIPPED | OUTPATIENT
Start: 2021-07-26 | End: 2021-08-02

## 2021-07-26 RX ORDER — HALOPERIDOL 5 MG/ML
1 INJECTION INTRAMUSCULAR
Status: DISCONTINUED | OUTPATIENT
Start: 2021-07-26 | End: 2021-07-26 | Stop reason: HOSPADM

## 2021-07-26 RX ORDER — LISINOPRIL/HYDROCHLOROTHIAZIDE 10-12.5 MG
1 TABLET ORAL DAILY
Status: DISCONTINUED | OUTPATIENT
Start: 2021-07-26 | End: 2021-07-26 | Stop reason: HOSPADM

## 2021-07-26 RX ORDER — MULTIPLE VITAMINS W/ MINERALS TAB 9MG-400MCG
1 TAB ORAL DAILY
COMMUNITY

## 2021-07-26 RX ORDER — ACETAMINOPHEN 325 MG/1
650 TABLET ORAL EVERY 4 HOURS PRN
Qty: 30 TABLET | Refills: 0 | Status: SHIPPED | OUTPATIENT
Start: 2021-07-29

## 2021-07-26 RX ORDER — SODIUM PHOSPHATE,MONO-DIBASIC 19G-7G/118
2 ENEMA (ML) RECTAL DAILY
COMMUNITY

## 2021-07-26 RX ORDER — POLYETHYLENE GLYCOL 3350 17 G/17G
17 POWDER, FOR SOLUTION ORAL DAILY
Status: DISCONTINUED | OUTPATIENT
Start: 2021-07-27 | End: 2021-07-26 | Stop reason: HOSPADM

## 2021-07-26 RX ORDER — ACETAMINOPHEN 325 MG/1
975 TABLET ORAL EVERY 8 HOURS
Status: DISCONTINUED | OUTPATIENT
Start: 2021-07-26 | End: 2021-07-26 | Stop reason: HOSPADM

## 2021-07-26 RX ORDER — CELECOXIB 200 MG/1
400 CAPSULE ORAL ONCE
Status: COMPLETED | OUTPATIENT
Start: 2021-07-26 | End: 2021-07-26

## 2021-07-26 RX ORDER — ASPIRIN 81 MG/1
81 TABLET ORAL 2 TIMES DAILY
Status: DISCONTINUED | OUTPATIENT
Start: 2021-07-26 | End: 2021-07-26 | Stop reason: HOSPADM

## 2021-07-26 RX ORDER — ALBUTEROL SULFATE 0.83 MG/ML
2.5 SOLUTION RESPIRATORY (INHALATION) EVERY 4 HOURS PRN
Status: DISCONTINUED | OUTPATIENT
Start: 2021-07-26 | End: 2021-07-26 | Stop reason: HOSPADM

## 2021-07-26 RX ORDER — ACETAMINOPHEN 325 MG/1
650 TABLET ORAL EVERY 4 HOURS PRN
Status: DISCONTINUED | OUTPATIENT
Start: 2021-07-29 | End: 2021-07-26 | Stop reason: HOSPADM

## 2021-07-26 RX ORDER — DIAZEPAM 5 MG
5 TABLET ORAL EVERY 6 HOURS PRN
Status: DISCONTINUED | OUTPATIENT
Start: 2021-07-26 | End: 2021-07-26 | Stop reason: HOSPADM

## 2021-07-26 RX ORDER — HYDROMORPHONE HCL IN WATER/PF 6 MG/30 ML
0.2 PATIENT CONTROLLED ANALGESIA SYRINGE INTRAVENOUS EVERY 5 MIN PRN
Status: ACTIVE | OUTPATIENT
Start: 2021-07-26 | End: 2021-07-26

## 2021-07-26 RX ORDER — HYDROMORPHONE HCL IN WATER/PF 6 MG/30 ML
0.4 PATIENT CONTROLLED ANALGESIA SYRINGE INTRAVENOUS
Status: DISCONTINUED | OUTPATIENT
Start: 2021-07-26 | End: 2021-07-26 | Stop reason: HOSPADM

## 2021-07-26 RX ORDER — ACETAMINOPHEN 325 MG/1
975 TABLET ORAL ONCE
Status: COMPLETED | OUTPATIENT
Start: 2021-07-26 | End: 2021-07-26

## 2021-07-26 RX ORDER — OXYCODONE HYDROCHLORIDE 5 MG/1
5-10 TABLET ORAL EVERY 6 HOURS PRN
Qty: 50 TABLET | Refills: 0 | Status: SHIPPED | OUTPATIENT
Start: 2021-07-26 | End: 2021-07-29

## 2021-07-26 RX ORDER — SODIUM CHLORIDE, SODIUM LACTATE, POTASSIUM CHLORIDE, CALCIUM CHLORIDE 600; 310; 30; 20 MG/100ML; MG/100ML; MG/100ML; MG/100ML
INJECTION, SOLUTION INTRAVENOUS CONTINUOUS
Status: DISCONTINUED | OUTPATIENT
Start: 2021-07-26 | End: 2021-07-26 | Stop reason: HOSPADM

## 2021-07-26 RX ORDER — FENTANYL CITRATE 50 UG/ML
50 INJECTION, SOLUTION INTRAMUSCULAR; INTRAVENOUS EVERY 5 MIN PRN
Status: ACTIVE | OUTPATIENT
Start: 2021-07-26 | End: 2021-07-26

## 2021-07-26 RX ORDER — ONDANSETRON 2 MG/ML
4 INJECTION INTRAMUSCULAR; INTRAVENOUS EVERY 6 HOURS PRN
Status: DISCONTINUED | OUTPATIENT
Start: 2021-07-26 | End: 2021-07-26 | Stop reason: HOSPADM

## 2021-07-26 RX ORDER — LIDOCAINE 40 MG/G
CREAM TOPICAL
Status: DISCONTINUED | OUTPATIENT
Start: 2021-07-26 | End: 2021-07-26 | Stop reason: HOSPADM

## 2021-07-26 RX ORDER — FENTANYL CITRATE 50 UG/ML
50 INJECTION, SOLUTION INTRAMUSCULAR; INTRAVENOUS
Status: COMPLETED | OUTPATIENT
Start: 2021-07-26 | End: 2021-07-26

## 2021-07-26 RX ORDER — ONDANSETRON 2 MG/ML
INJECTION INTRAMUSCULAR; INTRAVENOUS PRN
Status: DISCONTINUED | OUTPATIENT
Start: 2021-07-26 | End: 2021-07-26

## 2021-07-26 RX ORDER — AMOXICILLIN 250 MG
1 CAPSULE ORAL 2 TIMES DAILY
Status: DISCONTINUED | OUTPATIENT
Start: 2021-07-26 | End: 2021-07-26 | Stop reason: HOSPADM

## 2021-07-26 RX ORDER — NALOXONE HYDROCHLORIDE 0.4 MG/ML
0.2 INJECTION, SOLUTION INTRAMUSCULAR; INTRAVENOUS; SUBCUTANEOUS
Status: DISCONTINUED | OUTPATIENT
Start: 2021-07-26 | End: 2021-07-26 | Stop reason: HOSPADM

## 2021-07-26 RX ORDER — OXYCODONE HYDROCHLORIDE 5 MG/1
10 TABLET ORAL EVERY 4 HOURS PRN
Status: DISCONTINUED | OUTPATIENT
Start: 2021-07-26 | End: 2021-07-26 | Stop reason: HOSPADM

## 2021-07-26 RX ORDER — DIPHENHYDRAMINE HCL 25 MG
25 TABLET ORAL EVERY 6 HOURS PRN
Status: DISCONTINUED | OUTPATIENT
Start: 2021-07-26 | End: 2021-07-26 | Stop reason: HOSPADM

## 2021-07-26 RX ORDER — DEXAMETHASONE SODIUM PHOSPHATE 10 MG/ML
INJECTION, SOLUTION INTRAMUSCULAR; INTRAVENOUS PRN
Status: DISCONTINUED | OUTPATIENT
Start: 2021-07-26 | End: 2021-07-26

## 2021-07-26 RX ORDER — CEFAZOLIN SODIUM 2 G/100ML
2 INJECTION, SOLUTION INTRAVENOUS EVERY 8 HOURS
Status: DISCONTINUED | OUTPATIENT
Start: 2021-07-26 | End: 2021-07-26 | Stop reason: HOSPADM

## 2021-07-26 RX ORDER — SENNA AND DOCUSATE SODIUM 50; 8.6 MG/1; MG/1
2 TABLET, FILM COATED ORAL 2 TIMES DAILY
Qty: 50 TABLET | Refills: 0 | Status: SHIPPED | OUTPATIENT
Start: 2021-07-26 | End: 2022-03-18

## 2021-07-26 RX ORDER — BISACODYL 10 MG
10 SUPPOSITORY, RECTAL RECTAL DAILY PRN
Status: DISCONTINUED | OUTPATIENT
Start: 2021-07-26 | End: 2021-07-26 | Stop reason: HOSPADM

## 2021-07-26 RX ORDER — MAGNESIUM HYDROXIDE 1200 MG/15ML
LIQUID ORAL PRN
Status: DISCONTINUED | OUTPATIENT
Start: 2021-07-26 | End: 2021-07-26 | Stop reason: HOSPADM

## 2021-07-26 RX ORDER — CEFAZOLIN SODIUM IN 0.9 % NACL 3 G/100 ML
3 INTRAVENOUS SOLUTION, PIGGYBACK (ML) INTRAVENOUS SEE ADMIN INSTRUCTIONS
Status: DISCONTINUED | OUTPATIENT
Start: 2021-07-26 | End: 2021-07-26 | Stop reason: HOSPADM

## 2021-07-26 RX ORDER — HYDROXYZINE HYDROCHLORIDE 25 MG/1
25 TABLET, FILM COATED ORAL EVERY 6 HOURS PRN
Status: DISCONTINUED | OUTPATIENT
Start: 2021-07-26 | End: 2021-07-26 | Stop reason: HOSPADM

## 2021-07-26 RX ORDER — HYDROMORPHONE HCL IN WATER/PF 6 MG/30 ML
0.2 PATIENT CONTROLLED ANALGESIA SYRINGE INTRAVENOUS
Status: DISCONTINUED | OUTPATIENT
Start: 2021-07-26 | End: 2021-07-26 | Stop reason: HOSPADM

## 2021-07-26 RX ORDER — BUPIVACAINE HYDROCHLORIDE 7.5 MG/ML
INJECTION, SOLUTION INTRASPINAL
Status: COMPLETED | OUTPATIENT
Start: 2021-07-26 | End: 2021-07-26

## 2021-07-26 RX ORDER — OXYCODONE HCL 10 MG/1
10 TABLET, FILM COATED, EXTENDED RELEASE ORAL ONCE
Status: DISCONTINUED | OUTPATIENT
Start: 2021-07-26 | End: 2021-07-26 | Stop reason: HOSPADM

## 2021-07-26 RX ORDER — PRIMIDONE 250 MG/1
250 TABLET ORAL EVERY 6 HOURS SCHEDULED
Status: DISCONTINUED | OUTPATIENT
Start: 2021-07-26 | End: 2021-07-26 | Stop reason: HOSPADM

## 2021-07-26 RX ORDER — OXYCODONE HYDROCHLORIDE 5 MG/1
5 TABLET ORAL EVERY 4 HOURS PRN
Status: DISCONTINUED | OUTPATIENT
Start: 2021-07-26 | End: 2021-07-26 | Stop reason: HOSPADM

## 2021-07-26 RX ORDER — DIAZEPAM 10 MG/2ML
2.5 INJECTION, SOLUTION INTRAMUSCULAR; INTRAVENOUS
Status: DISCONTINUED | OUTPATIENT
Start: 2021-07-26 | End: 2021-07-26 | Stop reason: HOSPADM

## 2021-07-26 RX ORDER — ONDANSETRON 2 MG/ML
4 INJECTION INTRAMUSCULAR; INTRAVENOUS EVERY 30 MIN PRN
Status: DISCONTINUED | OUTPATIENT
Start: 2021-07-26 | End: 2021-07-26 | Stop reason: HOSPADM

## 2021-07-26 RX ORDER — CALCIUM CARBONATE 500 MG/1
500 TABLET, CHEWABLE ORAL 4 TIMES DAILY PRN
Status: DISCONTINUED | OUTPATIENT
Start: 2021-07-26 | End: 2021-07-26 | Stop reason: HOSPADM

## 2021-07-26 RX ORDER — ASPIRIN 81 MG/1
81 TABLET, CHEWABLE ORAL 2 TIMES DAILY
Qty: 60 TABLET | Refills: 0 | Status: SHIPPED | OUTPATIENT
Start: 2021-07-26 | End: 2022-03-18

## 2021-07-26 RX ORDER — ACETAMINOPHEN 325 MG/1
975 TABLET ORAL ONCE
Status: DISCONTINUED | OUTPATIENT
Start: 2021-07-26 | End: 2021-07-26 | Stop reason: HOSPADM

## 2021-07-26 RX ORDER — PROCHLORPERAZINE MALEATE 10 MG
10 TABLET ORAL EVERY 6 HOURS PRN
Status: DISCONTINUED | OUTPATIENT
Start: 2021-07-26 | End: 2021-07-26 | Stop reason: HOSPADM

## 2021-07-26 RX ORDER — DIPHENHYDRAMINE HYDROCHLORIDE 50 MG/ML
25 INJECTION INTRAMUSCULAR; INTRAVENOUS EVERY 6 HOURS PRN
Status: DISCONTINUED | OUTPATIENT
Start: 2021-07-26 | End: 2021-07-26 | Stop reason: HOSPADM

## 2021-07-26 RX ADMIN — TRANEXAMIC ACID 1950 MG: 650 TABLET ORAL at 07:49

## 2021-07-26 RX ADMIN — LIDOCAINE HYDROCHLORIDE 30 MG: 10 INJECTION, SOLUTION INFILTRATION; PERINEURAL at 09:51

## 2021-07-26 RX ADMIN — CARBAMAZEPINE 400 MG: 200 TABLET ORAL at 18:45

## 2021-07-26 RX ADMIN — CELECOXIB 400 MG: 200 CAPSULE ORAL at 07:49

## 2021-07-26 RX ADMIN — PROPOFOL 100 MCG/KG/MIN: 10 INJECTION, EMULSION INTRAVENOUS at 09:51

## 2021-07-26 RX ADMIN — ACETAMINOPHEN 975 MG: 325 TABLET ORAL at 17:46

## 2021-07-26 RX ADMIN — LISINOPRIL AND HYDROCHLOROTHIAZIDE 1 TABLET: 12.5; 1 TABLET ORAL at 17:47

## 2021-07-26 RX ADMIN — SODIUM CHLORIDE, POTASSIUM CHLORIDE, SODIUM LACTATE AND CALCIUM CHLORIDE: 600; 310; 30; 20 INJECTION, SOLUTION INTRAVENOUS at 08:50

## 2021-07-26 RX ADMIN — CEFAZOLIN SODIUM 2 G: 2 INJECTION, SOLUTION INTRAVENOUS at 17:45

## 2021-07-26 RX ADMIN — MIDAZOLAM HYDROCHLORIDE 2 MG: 1 INJECTION, SOLUTION INTRAMUSCULAR; INTRAVENOUS at 08:43

## 2021-07-26 RX ADMIN — ACETAMINOPHEN 975 MG: 325 TABLET ORAL at 07:49

## 2021-07-26 RX ADMIN — MAGNESIUM SULFATE HEPTAHYDRATE 4 G: 80 INJECTION, SOLUTION INTRAVENOUS at 08:47

## 2021-07-26 RX ADMIN — Medication 3 G: at 09:50

## 2021-07-26 RX ADMIN — BUPIVACAINE HYDROCHLORIDE AND EPINEPHRINE BITARTRATE 15 ML: 5; .005 INJECTION, SOLUTION PERINEURAL at 08:51

## 2021-07-26 RX ADMIN — PHENYLEPHRINE HYDROCHLORIDE 0.5 MCG/KG/MIN: 10 INJECTION INTRAVENOUS at 10:05

## 2021-07-26 RX ADMIN — DEXAMETHASONE SODIUM PHOSPHATE 10 MG: 10 INJECTION, SOLUTION INTRAMUSCULAR; INTRAVENOUS at 09:52

## 2021-07-26 RX ADMIN — BUPIVACAINE HYDROCHLORIDE IN DEXTROSE 2 ML: 7.5 INJECTION, SOLUTION SUBARACHNOID at 11:00

## 2021-07-26 RX ADMIN — SODIUM CHLORIDE, POTASSIUM CHLORIDE, SODIUM LACTATE AND CALCIUM CHLORIDE: 600; 310; 30; 20 INJECTION, SOLUTION INTRAVENOUS at 10:19

## 2021-07-26 RX ADMIN — FENTANYL CITRATE 50 MCG: 50 INJECTION, SOLUTION INTRAMUSCULAR; INTRAVENOUS at 09:33

## 2021-07-26 RX ADMIN — FENTANYL CITRATE 100 MCG: 50 INJECTION, SOLUTION INTRAMUSCULAR; INTRAVENOUS at 08:44

## 2021-07-26 RX ADMIN — OXYCODONE HYDROCHLORIDE 10 MG: 5 TABLET ORAL at 16:17

## 2021-07-26 RX ADMIN — MIDAZOLAM HYDROCHLORIDE 1 MG: 1 INJECTION, SOLUTION INTRAMUSCULAR; INTRAVENOUS at 09:33

## 2021-07-26 RX ADMIN — PRIMIDONE 250 MG: 250 TABLET ORAL at 18:45

## 2021-07-26 RX ADMIN — ONDANSETRON 4 MG: 2 INJECTION INTRAMUSCULAR; INTRAVENOUS at 09:52

## 2021-07-26 RX ADMIN — KETAMINE HYDROCHLORIDE 50 MG: 10 INJECTION, SOLUTION INTRAMUSCULAR; INTRAVENOUS at 10:01

## 2021-07-26 ASSESSMENT — MIFFLIN-ST. JEOR: SCORE: 2284.22

## 2021-07-26 ASSESSMENT — ENCOUNTER SYMPTOMS: SEIZURES: 1

## 2021-07-26 NOTE — TREATMENT PLAN
Orthopedic Surgery Pre-Op Plan: Michele Lezama  pre-op review. This is NOT an H&P   Surgeon: Dr. Ceja   [facility]: [facility]  Name of Surgery: Left Total Knee Arthroplasty   Date of Surgery: 7/26/21   H&P: Completed 7/22/21 by Dr. Silvano Lilly at [facility].   History of ASA, NSAIDS, vitamin and/or herbal supplements within 10 days: Yes- Ibuprofen- held since 7/20/21  History of blood thinners: No    Plan:   1) Discharge Plan: Home with assist of Spouse. Please see Discharge Planning section near bottom of this note for further details.     2) History of Seizures: Well-controlled on Tegretol and Mysoline. Reports last seizure was in 2008.     3) Hypertension: Well-controlled on lisinopril-hydrochlorothiazide.  Instructed to hold lisinopril-hydrochlorothiazide on the morning of surgery.    4) History of Migraines: On Emgality pen.     5) Morbid Obesity: BMI 46, Wt: 315 lbs. I recommend continued efforts at safe weight loss following recovery from surgery. If patient is interested in further assistance with weight loss, please consider referral to [facility]. They offer both non-surgical and surgical evidence-based weight loss strategies. Call [phone] to schedule a consultation to learn more.     Patient appears medically optimized for upcoming surgery. I would recommend Hospitalist Consult to assist with medical management. Please call me below with any questions on this patient.       Review of Systems Notable for: History of seizures, hypertension, history of migraines, morbid obesity.    Past Medical History:   Past Medical History:   Diagnosis Date     Arthritis      GERD (gastroesophageal reflux disease)      GERD (gastroesophageal reflux disease)      Hypertension      Hypertension      Migraine      Migraine      Morbid obesity (H)      Obese      Osteoarthritis      Seizure, petit mal (H) 2008     Seizures (H)     last seizure 2008     Sleep apnea       Sleep apnea      Past Surgical History:   Procedure Laterality Date     ARTHROSCOPY KNEE       C TOTAL KNEE ARTHROPLASTY Right 11/4/2020    Procedure: RIGHT TOTAL KNEE ARTHROPLASTY;  Surgeon: Karri Ceja MD;  Location: [facility];  Service: Orthopedics     EYE SURGERY       HC KNEE SCOPE,SINGLE MENISECTOMY Left 12/13/2016    Procedure: KNEE ARTHROSCOPY WITH MENISECTOMY, LEFT, PARTIAL MEDIAL MENISECTOMY ;  Surgeon: Karri Ceja MD;  Location: [facility];  Service: Orthopedics     OTHER SURGICAL HISTORY      carpel tunnel     TONSILLECTOMY         Current Medications:  Patient's Medications   New Prescriptions    No medications on file   Previous Medications    CARBAMAZEPINE (TEGRETOL) 200 MG TABLET    TAKE 2 TABLETS (400 MG) BY MOUTH 4 TIMES DAILY    GALCANEZUMAB-GNLM (EMGALITY) 120 MG/ML INJECTION    Inject 1 mL (120 mg) Subcutaneous every 28 days    IBUPROFEN (ADVIL,MOTRIN) 800 MG TABLET    TAKE 1 TABLET (800 MG) BY MOUTH 3 TIMES DAILY AS NEEDED    LISINOPRIL-HYDROCHLOROTHIAZIDE (PRINZIDE/ZESTORETIC) 10-12.5 MG TABLET    Take 1 tablet by mouth daily    PRIMIDONE (MYSOLINE) 250 MG TABLET    TAKE 1 TABLET (250 MG) BY MOUTH 4 TIMES DAILY   Modified Medications    No medications on file   Discontinued Medications    No medications on file       ALLERGIES:  Allergies   Allergen Reactions     Hydrocodone-Acetaminophen        Social History  Social History     Tobacco Use     Smoking status: Never Smoker     Smokeless tobacco: Never Used   Substance Use Topics     Alcohol use: No     Drug use: Never       Any Abnormal Recent Diagnostics? No    Discharge Planning:   Discharge plan according to [facility]:        07/21/21 0944   Discharge Planning   Patient/Family Anticipates Transition to home   Living Arrangements   People in home spouse   Is your private residence a single family home or apartment? Single family home   Number of Stairs, Within Home, Primary 8   Stair Railings, Within Home,  Primary railings safe and in good condition   Bathroom Shower/Tub Tub/Shower unit   Equipment Currently Used at Home none   Support System   Support Systems Spouse/Significant Other   Do you have someone available to stay with you one or two nights once you are home? Yes   Medical Clearance   Clinic Name [facility] Whyte, CNP   Advanced Practice Nurse Navigator- Orthopedics  [facility]   Phone: [phone]

## 2021-07-26 NOTE — OR NURSING
Total Knee Arthroplasty bone cuts disposed of at the end of the procedure in yellow hazard bag. Surgeon stated the bone did not need to be sent to pathology.

## 2021-07-26 NOTE — PHARMACY-ADMISSION MEDICATION HISTORY
Pharmacy Note - Admission Medication History    Pertinent Provider Information:      ______________________________________________________________________    Prior To Admission (PTA) med list completed and updated in EMR.       Prior to Admission Medications   Prescriptions Last Dose Informant Patient Reported? Taking?   carBAMazepine (TEGRETOL) 200 MG tablet 7/26/2021 at 0530  No Yes   Sig: TAKE 2 TABLETS (400 MG) BY MOUTH 4 TIMES DAILY   galcanezumab-gnlm (EMGALITY) 120 MG/ML injection ~1 week ago  No Yes   Sig: Inject 1 mL (120 mg) Subcutaneous every 28 days   glucosamine-chondroitin 500-400 MG CAPS per capsule 7/22/2021  Yes Yes   Sig: Take 2 capsules by mouth daily   ibuprofen (ADVIL,MOTRIN) 800 MG tablet 7/20/2021 at Unknown time  No Yes   Sig: TAKE 1 TABLET (800 MG) BY MOUTH 3 TIMES DAILY AS NEEDED   lisinopril-hydrochlorothiazide (PRINZIDE/ZESTORETIC) 10-12.5 MG tablet 7/25/2021 at Unknown time  Yes Yes   Sig: Take 1 tablet by mouth daily   multivitamin w/minerals (THERA-VIT-M) tablet 7/22/2021  Yes Yes   Sig: Take 1 tablet by mouth daily   primidone (MYSOLINE) 250 MG tablet 7/26/2021 at 0530  No Yes   Sig: TAKE 1 TABLET (250 MG) BY MOUTH 4 TIMES DAILY      Facility-Administered Medications: None       Information source(s): Patient and CareEverywhere/[facility]  Method of interview communication: in-person      Patient was asked about OTC/herbal products specifically.  PTA med list reflects this.    In the past week, patient estimated taking medication this percent of the time:  greater than 90%.    Allergies were reviewed, assessed, and updated with the patient.      Patient does not use any multi-dose medications prior to admission.    The information provided in this note is only as accurate as the sources available at the time of the update(s).    Thank you for the opportunity to participate in the care of this patient.    Dallas Church RPH  7/26/2021 8:09 AM

## 2021-07-26 NOTE — CONSULTS
[facility] MEDICINE CONSULT NOTE   Physician requesting consult: Karri Ceja MD    Reason for consult: Postoperative medical management of medical co-morbidities as below    Assessment and Plan    Michele Lezama is a 50 year old old male with a history of HTN, epilepsy, morbid obesity BMI 46, osteoarthritis underwent left TKA with spinal. [employer] [facility] [employer] service was asked to evaluate patient for postoperative medical management as follows below. Please resume the home medications as reconciled and further noted below with ordered hold parameters.  Vital signs have been stable post-operatively including hemodynamically stable blood pressure and heart rate. Thank you for this consult; we will continue to follow this patient until discharge.    HTN  -Order home medications lisinopril, hydrochlorothiazide, with the written tiered hold parameters given risk for post-operative hypotension. Given ACEi/ARB/diuretic medication, ordered creatine, potassium, and magnesium to evaluate renal function and electrolytes, respectively.      Epilepsy  -Order home medications, anti-seizures at scheduled timing as per PTA med rec.    Morbid Obesity BMI 46  -Confers higher willian-op risk  -PCP follow-up   -Lifestyle modifications, diet/exercise, caloric restriction counseling recommended.      S/p Left TKA  Knee OA  Acute Post-Op Pain  -Agree with current pain control regimen; consider acute pain team consultation if increasingly difficult to control or proves refractory.   -PT evaluation.   -OT evaluation.  -IS frequently, initially every hour while awake as tolerated. Directly encouraged and discussed.      Post-Op DVT Prophylaxis  -As per primary surgery team.      Procedure(s):  LEFT TOTAL KNEE ARTHROPLASTY  RIGHT KNEE MANIPULATION  Post-operative Day: Day of Surgery  Code status:No Order     Type of Anesthesia   Spinal    Estimated Blood Loss:  50 mL    Hospital Problem List   No problem-specific Assessment & Plan  notes found for this encounter.    Active Problems:    History of total knee arthroplasty, left      -Reviewed the patient's preoperative H and P and updated missing elements.  -Home medication reconciliation has been reviewed.  Medications have been ordered as noted from the home list and changes are documented above     HISTORY     Michele Lezama is a 50 year old old male with a history of HTN, epilepsy, morbid obesity BMI 46, osteoarthritis underwent left TKA with spinal.     He has been in his usual state of health prior to presenting for this elective surgery.  There is no endorsement of any associated symptoms prior to coming in today, and also no any recent travel domestically or internationally, and also no recent sick contacts around him including any family or friends who have been sick.  There is no endorsement of any fevers, rigors, chest pain or shortness of breath, nausea or vomiting or abdominal pain, changes in bowel movements/pattern, urinary symptoms, focal weakness, or any other new and associated symptoms at this time.  He has been compliant with his medications. 14 point review of systems performed with the patient without any other pertinent positives at this time.    Presently, he states his pain is currently well-tolerated but starting to ramp up.  He denies any new complaints or symptoms at this time. His wife Emily is at bedside.     His social history, family history, and past medical history were directly reviewed with him and corroborated to be accurate as documented below. All questions he had at this time were answered.      Past Medical History     Patient Active Problem List    Diagnosis Date Noted     History of total knee arthroplasty, left 07/26/2021     Priority: Medium     Morbid obesity (H) 08/20/2019     Priority: Medium     Dizziness 08/20/2019     Priority: Medium     Localization-related epilepsy (H) 05/18/2017     Priority: Medium        Surgical History   He  has a past  surgical history that includes Arthroscopy knee; Tonsillectomy; Eye surgery; other surgical history; KNEE SCOPE,MED-LAT MENISECTOMY (Left, 12/13/2016); and TOTAL KNEE ARTHROPLASTY (Right, 11/4/2020).     Past Surgical History:   Procedure Laterality Date     ARTHROSCOPY KNEE       C TOTAL KNEE ARTHROPLASTY Right 11/4/2020    Procedure: RIGHT TOTAL KNEE ARTHROPLASTY;  Surgeon: Karri Ceja MD;  Location: [facility];  Service: Orthopedics     EYE SURGERY       HC KNEE SCOPE,SINGLE MENISECTOMY Left 12/13/2016    Procedure: KNEE ARTHROSCOPY WITH MENISECTOMY, LEFT, PARTIAL MEDIAL MENISECTOMY ;  Surgeon: Karri Ceja MD;  Location: [facility];  Service: Orthopedics     OTHER SURGICAL HISTORY      carpel tunnel     TONSILLECTOMY         Family History    Reviewed - Family history directly personally reviewed and notable for mother with diabetes, arthritis, hypertension; father with asthma, lung cancer, hypertension some point maternal uncle with leukemia; no other family endorsed or noted.    Social History    Reviewed, and he  reports that he has never smoked. He has never used smokeless tobacco. He reports that he does not drink alcohol and does not use drugs.  Social History     Tobacco Use     Smoking status: Never Smoker     Smokeless tobacco: Never Used   Substance Use Topics     Alcohol use: No     Allergies     Allergies   Allergen Reactions     Hydrocodone-Acetaminophen      Interaction with Tegretol       Prior to Admission Medications      Medications Prior to Admission   Medication Sig Dispense Refill Last Dose     carBAMazepine (TEGRETOL) 200 MG tablet TAKE 2 TABLETS (400 MG) BY MOUTH 4 TIMES DAILY 720 tablet 3 7/26/2021 at 0930 (x2)     galcanezumab-gnlm (EMGALITY) 120 MG/ML injection Inject 1 mL (120 mg) Subcutaneous every 28 days 1 mL 11 ~1 week ago     glucosamine-chondroitin 500-400 MG CAPS per capsule Take 2 capsules by mouth daily   7/22/2021     ibuprofen (ADVIL,MOTRIN) 800 MG  tablet TAKE 1 TABLET (800 MG) BY MOUTH 3 TIMES DAILY AS NEEDED 90 tablet 3 7/20/2021 at Unknown time     lisinopril-hydrochlorothiazide (PRINZIDE/ZESTORETIC) 10-12.5 MG tablet Take 1 tablet by mouth daily   7/25/2021 at Unknown time     multivitamin w/minerals (THERA-VIT-M) tablet Take 1 tablet by mouth daily   7/22/2021     primidone (MYSOLINE) 250 MG tablet TAKE 1 TABLET (250 MG) BY MOUTH 4 TIMES DAILY 360 tablet 3 7/26/2021 at 0930 (x2)       Review of Systems   A 12 point comprehensive review of systems was negative except as noted above.    OBJECTIVE         Physical Exam   Temp:  [97.3  F (36.3  C)-98.8  F (37.1  C)] 97.3  F (36.3  C)  Pulse:  [64-88] 84  Resp:  [16-29] 22  BP: (103-132)/(55-81) 130/70  SpO2:  [93 %-100 %] 95 %  Body mass index is 46.35 kg/m .  GENERAL:  Alert, appears comfortable, in no acute distress, appears stated age   HEAD:  Normocephalic, without obvious abnormality, atraumatic   EYES:  PERRL, conjunctiva/corneas clear, no scleral icterus, EOM's intact   NOSE: Nares normal, septum midline, mucosa normal, no drainage   THROAT: Lips, mucosa, and tongue normal; teeth and gums normal, mouth moist   NECK: Supple, symmetrical, trachea midline   BACK:   Symmetric, no curvature, ROM normal   LUNGS:   Clear to auscultation bilaterally, no rales, rhonchi, or wheezing, symmetric chest rise on inhalation, respirations unlabored   CHEST WALL:  No tenderness or deformity   HEART:  Regular rate and rhythm, S1 and S2 normal, no murmur, rub, or gallop    ABDOMEN:   Soft, non-tender, bowel sounds active all four quadrants, no masses, no organomegaly, no rebound or guarding   EXTREMITIES: Extremities normal, atraumatic, no cyanosis or edema    SKIN: Dry to touch, no exanthems in the visualized areas   NEURO: Alert, oriented x 4, upper extremities nl; b/l LE's limited ROM and sensation 2/2 anesthesia still in-effect   PSYCH: Cooperative, behavior is appropriate        Cardiographics Reviewed Personally By  Myself   EKG Results: personally reviewed.   Echocardiogram: NA  Imaging Reviewed Personally By Myself    Radiology Results:   Recent Results (from the past 24 hour(s))   XR Knee Port Left 1/2 Views    Narrative    EXAM: XR KNEE PORT LEFT 1/2 VIEWS  LOCATION: [facility]  DATE/TIME: 7/26/2021 11:21 AM    INDICATION: Post-Op Total Knee  COMPARISON: None.      Impression    IMPRESSION: Left TKA. There is some gas within the joint as well as the adjacent soft tissues compatible with recent surgery. No fracture.       Labs Reviewed Personally By Myself     Results for orders placed or performed during the hospital encounter of 07/26/21 (from the past 24 hour(s))   XR Knee Port Left 1/2 Views    Narrative    EXAM: XR KNEE PORT LEFT 1/2 VIEWS  LOCATION: [facility]  DATE/TIME: 7/26/2021 11:21 AM    INDICATION: Post-Op Total Knee  COMPARISON: None.      Impression    IMPRESSION: Left TKA. There is some gas within the joint as well as the adjacent soft tissues compatible with recent surgery. No fracture.       Preoperative Labs Reviewed Personally By Myself     Thank you for this consultation.  Appreciate the opportunity to participate in the care of Michele Lezama, please feel free to contact us for any questions or concerns.      Lei Ashton MD  Internal Medicine  Hospitalist  [facility]  Phone: #[phone]

## 2021-07-26 NOTE — PLAN OF CARE
Patient vital signs are at baseline: Yes  Patient able to ambulate as they were prior to admission or with assist devices provided by therapies during their stay:  Yes  Patient MUST void prior to discharge:  Yes  Patient able to tolerate oral intake:  Yes  Pain has adequate pain control using Oral analgesics:  Yes  Nurse teaching given on 7/26/2021 and the patient expresses understanding and acceptance of instructions. Sudarshan Sánchez RN 7/26/2021 6:53 PM    Plan: discharge to home via private vehicle.

## 2021-07-26 NOTE — OP NOTE
Operative Note    Name:  Michele Lezama  Location: [facility] Main OR  Procedure Date:  7/26/2021  PCP:  No Ref-Primary, Physician      Procedure(s):  LEFT TOTAL KNEE ARTHROPLASTY  RIGHT KNEE MANIPULATION     Implant Name Type Inv. Item Serial No.  Lot No. LRB No. Used Action   IMP COMP FEM STRK TRIATHLN CR BD W/PA LT 5 3030-F-501 - [id number] Total Joint Component/Insert IMP COMP FEM STRK TRIATHLN CR BD W/PA LT 5 4417-F-501  [employer] [id number]  1 Implanted   monica Triathlon Tibial Bearing Insert- CS, #6 CS 10mm    [employer] [id number]  1 Implanted   IMP BASEPLATE TIBIAL STRK TRIATHLN KNEE SZ 6 1305-B-600 - [id number] Total Joint Component/Insert IMP BASEPLATE TIBIAL STRK TRIATHLN KNEE SZ 6 5836-B-600  [employer] [id number]  1 Implanted       Pre-Procedure Diagnosis:  Left knee pain [M25.562]     Post-Procedure Diagnosis:    Left knee osteoarthritis  Right knee arthrofibrosis    Surgeon(s):  Karri Ceja MD Luong, Thirath M, PA-C      Assistants:  Ronny Pollock PA-C,  Verona Mcintyre, KIARA for patient positioning, intraoperative retraction, patient safety, and wound closure.    Anesthesia Type:  Spinal     Findings:  Arthritis  Right knee ROM: 0-90 improved to 0-115     Operative Report:      After satisfactory infiltration of regional block anesthetic in the preoperative holding area, the patient was taken to the operating room.  Spinal anesthesia was administered.      A timeout was conducted for the right knee.  Exam was done, then it was manipulated with some release of scar tissue.    The patient's operative extremity was then prepped and draped sterile.  A timeout was then taken to ensure proper surgical site.  A medial parapatellar arthrotomy was then performed.  The menisci and fat pad were sacrificed.  The distal femoral cut was made taking 8mm off, 5 degrees valgus.  Osteophytes were then removed.  Proximal tibia cut was then made.  The proper rotation of the distal femur  was then set, sized, chamfer cuts were made.  At this point an intraoperative standardized cocktail was infiltrated around the periarticular soft tissues.  Trials were implanted.  Patella was everted and denervated with cautery.  Trials showed excellent flexion and extension gaps, excellent range of motion with excellent patellar tracking.  Trials were removed and the tibial keel punch was then made.  The wound then copiously irrigated.  Components then impacted into place.  The wound irrigated once more.  The wound then closed in layers: #1 Vicryl for the extensor mechanism, 2-0 Vicryl for the subcutaneous tissues, 3-0 Monocryl for the skin followed by Dermabond.  Sterile dressings were applied.  The patient was then awakened and taken to the [facility] stable.    Plan:  Initiate DVT prophylaxis protocol including early ambulation, mechanical and chemical prophylaxis.    Estimated Blood Loss:   50cc       Complications:    None    Karri Ceja MD     Date: 7/26/2021  Time: 10:30 AM

## 2021-07-26 NOTE — ANESTHESIA PREPROCEDURE EVALUATION
Anesthesia Pre-Procedure Evaluation    Patient: Michele Lezama   MRN: [id number] : 1970        Preoperative Diagnosis: Left knee pain [M25.562]   Procedure : Procedure(s):  LEFT TOTAL KNEE ARTHROPLASTY     Past Medical History:   Diagnosis Date     Arthritis      GERD (gastroesophageal reflux disease)      GERD (gastroesophageal reflux disease)      Hypertension      Hypertension      Migraine      Migraine      Morbid obesity (H)      Obese      Osteoarthritis      Seizure, petit mal (H)      Seizures (H)     last seizure      Sleep apnea      Sleep apnea       Past Surgical History:   Procedure Laterality Date     ARTHROSCOPY KNEE       C TOTAL KNEE ARTHROPLASTY Right 2020    Procedure: RIGHT TOTAL KNEE ARTHROPLASTY;  Surgeon: Karri Ceja MD;  Location: [facility] OR;  Service: Orthopedics     EYE SURGERY       HC KNEE SCOPE,SINGLE MENISECTOMY Left 2016    Procedure: KNEE ARTHROSCOPY WITH MENISECTOMY, LEFT, PARTIAL MEDIAL MENISECTOMY ;  Surgeon: Karri Ceja MD;  Location: [facility] OR;  Service: Orthopedics     OTHER SURGICAL HISTORY      carpel tunnel     TONSILLECTOMY        Allergies   Allergen Reactions     Hydrocodone-Acetaminophen      Interaction with Tegretol      Social History     Tobacco Use     Smoking status: Never Smoker     Smokeless tobacco: Never Used   Substance Use Topics     Alcohol use: No      Wt Readings from Last 1 Encounters:   21 142.4 kg (313 lb 14.4 oz)        Anesthesia Evaluation   Pt has had prior anesthetic.     No history of anesthetic complications       ROS/MED HX  ENT/Pulmonary:     (+) sleep apnea, uses CPAP,     Neurologic:  - neg neurologic ROS   (+) seizures,     Cardiovascular:     (+) hypertension-----    METS/Exercise Tolerance:     Hematologic:       Musculoskeletal:       GI/Hepatic:     (+) GERD,     Renal/Genitourinary:       Endo:     (+) Obesity,     Psychiatric/Substance Use:  - neg psychiatric ROS     Infectious  Disease:       Malignancy:       Other:            Physical Exam    Airway        Mallampati: II   TM distance: > 3 FB   Neck ROM: full     Respiratory Devices and Support         Dental  no notable dental history         Cardiovascular   cardiovascular exam normal          Pulmonary   pulmonary exam normal                OUTSIDE LABS:  CBC:   Lab Results   Component Value Date    WBC 8.0 04/21/2014    HGB 12.5 (L) 11/05/2020    HGB 15.0 04/21/2014    HCT 43.3 04/21/2014     04/21/2014     BMP:   Lab Results   Component Value Date     04/21/2014     05/16/2013    POTASSIUM 3.9 04/21/2014    POTASSIUM 3.8 05/16/2013    CHLORIDE 107 04/21/2014    CHLORIDE 106 05/16/2013    BUN 20 04/21/2014    CR 0.79 11/04/2020    CR 0.99 04/21/2014    GLC 87 04/21/2014    GLC 93 05/16/2013     COAGS:   Lab Results   Component Value Date    INR 0.99 11/04/2020     POC: No results found for: BGM, HCG, HCGS  HEPATIC:   Lab Results   Component Value Date    ALBUMIN 3.8 04/21/2014    PROTTOTAL 7.4 04/21/2014    ALT 21 04/21/2014    AST 24 04/21/2014    ALKPHOS 73 04/21/2014    BILITOTAL 0.3 04/21/2014     OTHER:   Lab Results   Component Value Date    BEE 8.8 04/21/2014       Anesthesia Plan    ASA Status:  3      Anesthesia Type: Spinal.              Consents    Anesthesia Plan(s) and associated risks, benefits, and realistic alternatives discussed. Questions answered and patient/representative(s) expressed understanding.     - Discussed with:  Patient      - Extended Intubation/Ventilatory Support Discussed: No.         Postoperative Care    Pain management: IV analgesics, Oral pain medications, Peripheral nerve block (Single Shot), Multi-modal analgesia.   PONV prophylaxis: Dexamethasone or Solumedrol, Ondansetron (or other 5HT-3), Background Propofol Infusion     Comments:                Lang Zheng MD

## 2021-07-26 NOTE — ANESTHESIA POSTPROCEDURE EVALUATION
Patient: Michele Lezama    Procedure(s):  LEFT TOTAL KNEE ARTHROPLASTY  RIGHT KNEE MANIPULATION    Diagnosis:Left knee pain [M25.562]  Diagnosis Additional Information: No value filed.    Anesthesia Type:  Spinal    Note:  Disposition: Outpatient   Postop Pain Control: Uneventful            Sign Out: Well controlled pain   PONV: No   Neuro/Psych: Uneventful            Sign Out: Acceptable/Baseline neuro status   Airway/Respiratory: Uneventful            Sign Out: Acceptable/Baseline resp. status   CV/Hemodynamics: Uneventful            Sign Out: Acceptable CV status; No obvious hypovolemia; No obvious fluid overload   Other NRE: NONE   DID A NON-ROUTINE EVENT OCCUR?            Last vitals:  Vitals Value Taken Time   /72 07/26/21 1300   Temp 36.3  C (97.3  F) 07/26/21 1110   Pulse 77 07/26/21 1327   Resp 25 07/26/21 1152   SpO2 98 % 07/26/21 1327   Vitals shown include unvalidated device data.    Electronically Signed By: Lang Zheng MD  July 26, 2021  1207 PM

## 2021-07-26 NOTE — ANESTHESIA PROCEDURE NOTES
Intrathecal injection Procedure Note  Pre-Procedure   Staff -        Anesthesiologist:  Lang Zheng MD       Performed By: anesthesiologist       Location: OR       Procedure Start/Stop Times: 7/26/2021 9:34 AM and 7/26/2021 9:38 AM       Pre-Anesthestic Checklist: patient identified, IV checked, risks and benefits discussed, informed consent, monitors and equipment checked and pre-op evaluation  Timeout:       Correct Patient: Yes        Correct Procedure: Yes        Correct Site: Yes        Correct Position: Yes   Procedure Documentation  Procedure: intrathecal injection       Patient Position: sitting       Skin prep: Chloraprep       Insertion Site: L2-3. (midline approach).       Needle Gauge: 24.        Needle Length (Inches): 5        Spinal Needle Type: Pencan       Introducer used      # of attempts: 2 and  # of redirects:     Assessment/Narrative         Paresthesias: No.       CSF fluid: clear.    Medication(s) Administered   0.75% Hyperbaric Bupivacaine (Intrathecal), 2 mL

## 2021-07-26 NOTE — ANESTHESIA CARE TRANSFER NOTE
Patient: Michele Lezama    Procedure(s):  LEFT TOTAL KNEE ARTHROPLASTY  RIGHT KNEE MANIPULATION    Diagnosis: Left knee pain [M25.562]  Diagnosis Additional Information: No value filed.    Anesthesia Type:   Spinal     Note:    Oropharynx: oropharynx clear of all foreign objects and spontaneously breathing  Level of Consciousness: awake  Oxygen Supplementation: face mask  Level of Supplemental Oxygen (L/min / FiO2): 6  Independent Airway: airway patency satisfactory and stable  Dentition: dentition unchanged  Vital Signs Stable: post-procedure vital signs reviewed and stable  Report to RN Given: handoff report given  Patient transferred to: PACU    Handoff Report: Identifed the Patient, Identified the Reponsible Provider, Reviewed the pertinent medical history, Discussed the surgical course, Reviewed Intra-OP anesthesia mangement and issues during anesthesia, Set expectations for post-procedure period and Allowed opportunity for questions and acknowledgement of understanding      Vitals:  Vitals Value Taken Time   /57 07/26/21 1110   Temp 36.3  C (97.3  F) 07/26/21 1110   Pulse 80 07/26/21 1114   Resp 27 07/26/21 1114   SpO2 100 % 07/26/21 1114   Vitals shown include unvalidated device data.    Electronically Signed By: SARIKA Schultz CRNA  July 26, 2021  11:15 AM

## 2021-07-26 NOTE — ANESTHESIA PROCEDURE NOTES
Adductor canal Procedure Note  Pre-Procedure   Staff -        Anesthesiologist:  Lang Zheng MD       Performed By: anesthesiologist       Location: pre-op       Procedure Start/Stop Times: 7/26/2021 8:44 AM and 7/26/2021 8:49 AM       Pre-Anesthestic Checklist: patient identified, IV checked, site marked, risks and benefits discussed, informed consent, monitors and equipment checked, pre-op evaluation, at physician/surgeon's request and post-op pain management  Timeout:       Correct Patient: Yes        Correct Procedure: Yes        Correct Site: Yes        Correct Position: Yes        Correct Laterality: Yes        Site Marked: Yes  Procedure Documentation  Procedure: Adductor canal       Laterality: left       Patient Position: supine       Skin prep: Chloraprep       Needle Type: short bevel       Needle Gauge: 21.        Needle Length (Inches): 6        Ultrasound guided       1. Ultrasound was used to identify targeted nerve, plexus, vascular marker, or fascial plane and place a needle adjacent to it in real-time.       2. Ultrasound was used to visualize the spread of anesthetic in close proximity to the above referenced structure.       3. A permanent image is entered into the patient's record.       4. The visualized anatomic structures appeared normal.       5. There were no apparent abnormal pathologic findings.    Assessment/Narrative         The placement was negative for: blood aspirated, painful injection and site bleeding       Paresthesias: No.     Bolus given via needle..        Secured via.        Insertion/Infusion Method: Single Shot       Complications: none    Medication(s) Administered   Bupivacaine 0.5% w/ 1:200K Epi (Other), 15 mL

## 2021-07-27 NOTE — PROGRESS NOTES
Surgeon: Dr. Ceja  [facility]: Annabelle  Name of Surgery: Left total Knee arthroplasy  Date of Surgery: 7/26/2021    Hi  I am a registerednurse calling from [facility] to check in and see how you are doing following your joint replacement surgery today.    1) Is your pain level manageable? NA  If not, have you been taking your pain medications asprescribed and have you tried icing and elevating your surgical extremity? NA  2) Are you having any new or increased numbness or tingling in your surgical leg? NA  3) Are you having increased swelling around yoursurgical site? NA.   4) Are you having any drainage from your incision? NA  If so, is the drainage saturating or coming through your dressing? NA  5) Have you been able to walk short distances around your home witha walker? NA  6) Have you been able to urinate since surgery? NA  7) If patient is a uni-compartmental-knee or total knee replacement, do they have outpatient physical therapy set up? NA . If not, direct them tocontact their surgeon's office to ask if they recommend outpatient physical therapy. Please note: most total hip replacement patients do not need outpatient physical therapy unless their surgeon specifically orders it.    7) Do you have any other questions or concerns at this time? NA  If patient has significant concerns after hours make sure they have appropriate after hours call number for their surgeon.      Pt called, no answer.  Left message for pt to call surgeon's office with any concerns    Amber Bella RN

## 2021-07-27 NOTE — PROGRESS NOTES
"Surgeon:    [facility] Annabelel  Name of Surgery:   Date of Surgery: 7/26/21    Hi,  I am a registerednurse calling from [facility] to check in and see how you are doing following your joint replacement surgery today.    1) Is your pain level manageable? Yes  If not, have you been taking your pain medications asprescribed and have you tried icing and elevating your surgical extremity? Yes, \"The ice helps a lot\"   2) Are you having any new or increased numbness or tingling in your surgical leg? The toes started to tingle so I adjusted the ace wrap.   3) Are you having increased swelling around yoursurgical site? No.   4) Are you having any drainage from your incision? No  If so, is the drainage saturating or coming through your dressing? NA  5) Have you been able to walk short distances around your home witha walker? \"Not yet because we have been at the pharmacy waiting for medications\"   6) Have you been able to urinate since surgery? Yes   7) If patient is a uni-compartmental-knee or total knee replacement, do they have outpatient physical therapy set up? Yes and I have appointments set up.  7) Do you have any other questions or concerns at this time? \"Not at this time.\"  If patient has significant concerns after hours make sure they have appropriate after hours call number for their surgeon.          "

## 2021-08-13 ENCOUNTER — VIRTUAL VISIT (OUTPATIENT)
Dept: NEUROLOGY | Facility: CLINIC | Age: 51
End: 2021-08-13
Payer: COMMERCIAL

## 2021-08-13 DIAGNOSIS — G40.109 FOCAL EPILEPSY (H): Primary | ICD-10-CM

## 2021-08-13 PROCEDURE — 99441 PR PHYSICIAN TELEPHONE EVALUATION 5-10 MIN: CPT | Performed by: PSYCHIATRY & NEUROLOGY

## 2021-08-13 NOTE — LETTER
Date:September 1, 2021      Patient was self referred, no letter generated. Do not send.        [facility] Health Information

## 2021-08-13 NOTE — LETTER
2021         RE: Michele Lezama  [address] [address]        Dear Colleague,    Thank you for referring your patient, Michele Lezama, to the [facility] [place]. Please see a copy of my visit note below.    Michele is a 50 year old who is being evaluated via a billable telephone visit.      What phone number would you like to be contacted at? [phone]  How would you like to obtain your AVS? [facility]     NEUROLOGY PROGRESS NOTE   [facility]    Patient:Michele Lezama  : 1970  Age: 50 year old  Today's Virtual Visit: 2021    History of present illness:     Mr. Bautista is participating in this virtual visit for management of his seizures. He was seen couple months ago and his seizures are controlled.   He has made this appointment just to get his DMV form filled out.     Other medical problems: He had a total left knee replacement couple weeks ago. He has pain and can't sleep well.    He has a headache because he didn't sleep well. He uses Excedrin and ibuprofen together and that helps. He tried Imitrex in the past and got upset stomach, and copper taste.     Current Outpatient Medications   Medication Sig Dispense Refill     acetaminophen (TYLENOL) 325 MG tablet Take 2 tablets (650 mg) by mouth every 4 hours as needed for other (For optimal non-opioid multimodal pain management to improve pain control.) 30 tablet 0     carBAMazepine (TEGRETOL) 200 MG tablet TAKE 2 TABLETS (400 MG) BY MOUTH 4 TIMES DAILY 720 tablet 3     galcanezumab-gnlm (EMGALITY) 120 MG/ML injection Inject 1 mL (120 mg) Subcutaneous every 28 days 1 mL 11     glucosamine-chondroitin 500-400 MG CAPS per capsule Take 2 capsules by mouth daily       hydrOXYzine (ATARAX) 25 MG tablet Take 2 tablets (50 mg) by mouth every 6 hours as needed for itching 90 tablet 0     ibuprofen (ADVIL,MOTRIN) 800 MG tablet TAKE 1 TABLET (800 MG) BY MOUTH 3 TIMES DAILY AS NEEDED 90 tablet 3      lisinopril-hydrochlorothiazide (PRINZIDE/ZESTORETIC) 10-12.5 MG tablet Take 1 tablet by mouth daily       multivitamin w/minerals (THERA-VIT-M) tablet Take 1 tablet by mouth daily       primidone (MYSOLINE) 250 MG tablet TAKE 1 TABLET (250 MG) BY MOUTH 4 TIMES DAILY 360 tablet 3     SENNA-docusate sodium (SENNA S) 8.6-50 MG tablet Take 2 tablets by mouth 2 times daily 50 tablet 0     aspirin (ASA) 81 MG chewable tablet Take 1 tablet (81 mg) by mouth 2 times daily (Patient not taking: Reported on 8/13/2021) 60 tablet 0     Assessment/Plan:     #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 Seizures have been controlled for almost 9 years. He had 2ry generalized tonic-clonic seizures in childhood and later had focal impaired-aware seizures. He is taking primidone 250 mg 4 times a day and carbamazepine 400 mg 4 times a day. Levels are therapeutic. Denies side effects.   He made this appointment to get his DMV form filled out. I did let him know that he did not need an appointment and he could just send the form to the clinic to be filled out.     - Continue ASDs as before.  - Will fill out the DMV form.  - Follow up in 1 year.        As described above, I spoke with the patient for 10 minutes and during this time counseling was greater than 50% of the visit time. Will not charge the patient for this visit.   Nerissa Wooten MD          Again, thank you for allowing me to participate in the care of your patient.        Sincerely,        Nerissa Wooten MD

## 2021-08-13 NOTE — PROGRESS NOTES
Michele is a 50 year old who is being evaluated via a billable telephone visit.      What phone number would you like to be contacted at? [phone]  How would you like to obtain your AVS? [facility]     NEUROLOGY PROGRESS NOTE   [facility]    Patient:Michele Lezama  : 1970  Age: 50 year old  Today's Virtual Visit: 2021    History of present illness:     Mr. Bautista is participating in this virtual visit for management of his seizures. He was seen couple months ago and his seizures are controlled.   He has made this appointment just to get his DMV form filled out.     Other medical problems: He had a total left knee replacement couple weeks ago. He has pain and can't sleep well.    He has a headache because he didn't sleep well. He uses Excedrin and ibuprofen together and that helps. He tried Imitrex in the past and got upset stomach, and copper taste.     Current Outpatient Medications   Medication Sig Dispense Refill     acetaminophen (TYLENOL) 325 MG tablet Take 2 tablets (650 mg) by mouth every 4 hours as needed for other (For optimal non-opioid multimodal pain management to improve pain control.) 30 tablet 0     carBAMazepine (TEGRETOL) 200 MG tablet TAKE 2 TABLETS (400 MG) BY MOUTH 4 TIMES DAILY 720 tablet 3     galcanezumab-gnlm (EMGALITY) 120 MG/ML injection Inject 1 mL (120 mg) Subcutaneous every 28 days 1 mL 11     glucosamine-chondroitin 500-400 MG CAPS per capsule Take 2 capsules by mouth daily       hydrOXYzine (ATARAX) 25 MG tablet Take 2 tablets (50 mg) by mouth every 6 hours as needed for itching 90 tablet 0     ibuprofen (ADVIL,MOTRIN) 800 MG tablet TAKE 1 TABLET (800 MG) BY MOUTH 3 TIMES DAILY AS NEEDED 90 tablet 3     lisinopril-hydrochlorothiazide (PRINZIDE/ZESTORETIC) 10-12.5 MG tablet Take 1 tablet by mouth daily       multivitamin w/minerals (THERA-VIT-M) tablet Take 1 tablet by mouth daily       primidone (MYSOLINE) 250 MG tablet TAKE 1 TABLET (250 MG) BY MOUTH 4 TIMES DAILY 360  tablet 3     SENNA-docusate sodium (SENNA S) 8.6-50 MG tablet Take 2 tablets by mouth 2 times daily 50 tablet 0     aspirin (ASA) 81 MG chewable tablet Take 1 tablet (81 mg) by mouth 2 times daily (Patient not taking: Reported on 8/13/2021) 60 tablet 0     Assessment/Plan:     #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 Seizures have been controlled for almost 9 years. He had 2ry generalized tonic-clonic seizures in childhood and later had focal impaired-aware seizures. He is taking primidone 250 mg 4 times a day and carbamazepine 400 mg 4 times a day. Levels are therapeutic. Denies side effects.   He made this appointment to get his DMV form filled out. I did let him know that he did not need an appointment and he could just send the form to the clinic to be filled out.     - Continue ASDs as before.  - Will fill out the DMV form.  - Follow up in 1 year.        As described above, I spoke with the patient for 10 minutes and during this time counseling was greater than 50% of the visit time. Will not charge the patient for this visit.   Nerissa Wooten MD

## 2021-08-16 ENCOUNTER — TELEPHONE (OUTPATIENT)
Dept: NEUROLOGY | Facility: CLINIC | Age: 51
End: 2021-08-16

## 2021-10-01 ENCOUNTER — TELEPHONE (OUTPATIENT)
Dept: NEUROLOGY | Facility: CLINIC | Age: 51
End: 2021-10-01

## 2021-10-01 NOTE — LETTER
10/1/2021       RE: Michele Lezama  : 1970   MRN: [id number]      To whom it may concern,    Mr. Michele Lezama has been under my care for his epilepsy.  Keeping a consistent work/sleep schedule is important for seizure control and medication compliance. Rotating shifts would disrupt sleep which may trigger seizures. I strongly recommend to keep Mr. Lezama on straight day shifts to prevent seizures.     Please let me know if you have any concerns regarding this patient.     Thank you,    Nerissa Wooten MD  Epileptologist  [facility]        Michele Lezama  [address] [address]

## 2021-10-01 NOTE — TELEPHONE ENCOUNTER
What is the concern that needs to be addressed by a nurse? Patient is wanting a letter for going back to work that is for a set shift that allows him take his medication at the proper times instead of rotating shifts.    May a detailed message be left on voicemail? Yes    Date of last office visit: 8/13/21    Message routed to: [facility] RN pool

## 2021-10-03 ENCOUNTER — HEALTH MAINTENANCE LETTER (OUTPATIENT)
Age: 51
End: 2021-10-03

## 2021-10-05 NOTE — TELEPHONE ENCOUNTER
Called the pt back to discuss the specifics on time frames for work but was unable to reach them, a message was left on their VM requesting a call back to the clinic.   Jael King RN

## 2021-10-06 NOTE — TELEPHONE ENCOUNTER
Tried to reach the pt again without success. Another message was left on his VM requesting a call back.    Jael King RN   Neurology Care Coordinator

## 2021-10-06 NOTE — TELEPHONE ENCOUNTER
The pt's job has asked him to do rotating shift between days, evenings and nights. He is requesting a letter saying it is medically necessary to keep him on straight day shifts in order to stay consistent with taking his epilepsy medication. Request routed to Dr Clark asking for authorization.     Jael King RN   Neurology Care Coordinator

## 2021-10-18 ENCOUNTER — TELEPHONE (OUTPATIENT)
Dept: NEUROLOGY | Facility: CLINIC | Age: 51
End: 2021-10-18

## 2021-10-18 NOTE — TELEPHONE ENCOUNTER
The pt has been suffering off and on with a migraine over the past week. He has taken Excedrin and Tylenol with only temporary relief. RN advised that he schedule a visit with Dr Clark to go over treatment options. He is scheduled for tomorrow afternoon.    Jael King RN   Neurology Care Coordinator

## 2021-10-18 NOTE — TELEPHONE ENCOUNTER
[facility] Call Center    Phone Message    May a detailed message be left on voicemail: yes     Reason for Call: Symptoms or Concerns     If patient has red-flag symptoms, warm transfer to triage line    Current symptom or concern: Migraine    Symptoms have been present for:  1 week(s)    Has patient previously been seen for this? Yes    By Dr. Wooten    Are there any new or worsening symptoms? Yes: Pt has had Migraine for over a week. He went to [address] [place] [address] in [place] on Thuirsday and CT Scan was normal.      Action Taken: Message routed to:  [facility] Neu[facility]    Travel Screening: Not Applicable

## 2021-10-19 ENCOUNTER — VIRTUAL VISIT (OUTPATIENT)
Dept: NEUROLOGY | Facility: CLINIC | Age: 51
End: 2021-10-19
Payer: COMMERCIAL

## 2021-10-19 DIAGNOSIS — G43.711 INTRACTABLE CHRONIC MIGRAINE WITHOUT AURA AND WITH STATUS MIGRAINOSUS: Primary | ICD-10-CM

## 2021-10-19 NOTE — PROGRESS NOTES
Michele is a 50 year old who is being evaluated via a billable video visit.      How would you like to obtain your AVS? MyChart  If the video visit is dropped, the invitation should be resent by: Text to cell phone: [phone]  Will anyone else be joining your video visit? No  Video Start Time: 1:39  Video-Visit Details    Type of service:  Video Visit    Video End Time:1:51  Originating Location (pt. Location): Home    Distant Location (provider location):  [facility]     Platform used for Video Visit: [facility] Progress Note      Patient:  Michele Lezama  :  1970   Age:  50 year old   Today's virtual Visit:  10/19/2021    History of Present Illness:  Michele is participating in this virtual visit for evaluation of his migraine with new features.  He usually has right sided migraines, but since last  he had a headache behind his left ear which went behind his eyes. He went to ER last Thursday and got Toradol. Sometimes there is a dull throbbing around his left ear, it's mild and he doesn't even take Tylenol.   He had a CT head which was unremarkable.   Initially his headache was very severe, and he had it for 5 days. He was very sensitive to light which resolved.   This was an unusual headache. His right sided headaches are usually resolved with Ibuprofen or Excedrin. He gets them once a week, before taking Emgality, they happened twice a week. Last injection was . His PCP prescribes it for him.     Current Outpatient Medications   Medication Sig Dispense Refill     acetaminophen (TYLENOL) 325 MG tablet Take 2 tablets (650 mg) by mouth every 4 hours as needed for other (For optimal non-opioid multimodal pain management to improve pain control.) 30 tablet 0     carBAMazepine (TEGRETOL) 200 MG tablet TAKE 2 TABLETS (400 MG) BY MOUTH 4 TIMES DAILY 720 tablet 3     galcanezumab-gnlm (EMGALITY) 120 MG/ML injection Inject 1 mL (120 mg) Subcutaneous every  28 days 1 mL 11     glucosamine-chondroitin 500-400 MG CAPS per capsule Take 2 capsules by mouth daily       hydrOXYzine (ATARAX) 25 MG tablet Take 2 tablets (50 mg) by mouth every 6 hours as needed for itching 90 tablet 0     ibuprofen (ADVIL,MOTRIN) 800 MG tablet TAKE 1 TABLET (800 MG) BY MOUTH 3 TIMES DAILY AS NEEDED 90 tablet 3     lisinopril-hydrochlorothiazide (PRINZIDE/ZESTORETIC) 10-12.5 MG tablet Take 1 tablet by mouth daily       multivitamin w/minerals (THERA-VIT-M) tablet Take 1 tablet by mouth daily       primidone (MYSOLINE) 250 MG tablet TAKE 1 TABLET (250 MG) BY MOUTH 4 TIMES DAILY 360 tablet 3     SENNA-docusate sodium (SENNA S) 8.6-50 MG tablet Take 2 tablets by mouth 2 times daily 50 tablet 0     aspirin (ASA) 81 MG chewable tablet Take 1 tablet (81 mg) by mouth 2 times daily (Patient not taking: Reported on 8/13/2021) 60 tablet 0     Assessment and Plan:    Migraines: Patient has a history of migraine chronic migraines.  His migraine headache was selected different this time with being right-sided and much more severe with significant light sensitivity.  He went to ER and had Toradol.  Head CT was negative.  It was very severe for 5 days and is now improving.  He is getting Emgality every month.  This has decreased his headaches by 50%.      -Continue Emgality 1 mL every 28 days.  -Follow-up with primary care physician for headaches.          As described above, I met with the patient for 12 minutes and during this time counseling was within 50% of the visit time.  Nerissa Wooten MD

## 2021-10-19 NOTE — LETTER
10/19/2021       RE: Michele Lezama  : 1970   MRN: [id number]      Dear Colleague,    Thank you for referring your patient, Michele Lezama, to the [facility] EPILEPSY [facility]ARE at [facility]. Please see a copy of my visit note below.    Called pt mobile and left message to call back.  Angelica Rascon, EMT      Michele is a 50 year old who is being evaluated via a billable video visit.      How would you like to obtain your AVS? MyChart  If the video visit is dropped, the invitation should be resent by: Text to cell phone: [phone]  Will anyone else be joining your video visit? No  Video Start Time: 1:39  Video-Visit Details    Type of service:  Video Visit    Video End Time:1:51  Originating Location (pt. Location): Home    Distant Location (provider location):  [facility] EPILEPSY [facility]     Platform used for Video Visit: [facility] Epilepsy [facility] Progress Note      Patient:  Michele Lezama  :  1970   Age:  50 year old   Today's virtual Visit:  10/19/2021    History of Present Illness:  Michele is participating in this virtual visit for evaluation of his migraine with new features.  He usually has right sided migraines, but since last  he had a headache behind his left ear which went behind his eyes. He went to ER last Thursday and got Toradol. Sometimes there is a dull throbbing around his left ear, it's mild and he doesn't even take Tylenol.   He had a CT head which was unremarkable.   Initially his headache was very severe, and he had it for 5 days. He was very sensitive to light which resolved.   This was an unusual headache. His right sided headaches are usually resolved with Ibuprofen or Excedrin. He gets them once a week, before taking Emgality, they happened twice a week. Last injection was . His PCP prescribes it for him.     Current Outpatient Medications   Medication Sig Dispense Refill     acetaminophen (TYLENOL) 325 MG  tablet Take 2 tablets (650 mg) by mouth every 4 hours as needed for other (For optimal non-opioid multimodal pain management to improve pain control.) 30 tablet 0     carBAMazepine (TEGRETOL) 200 MG tablet TAKE 2 TABLETS (400 MG) BY MOUTH 4 TIMES DAILY 720 tablet 3     galcanezumab-gnlm (EMGALITY) 120 MG/ML injection Inject 1 mL (120 mg) Subcutaneous every 28 days 1 mL 11     glucosamine-chondroitin 500-400 MG CAPS per capsule Take 2 capsules by mouth daily       hydrOXYzine (ATARAX) 25 MG tablet Take 2 tablets (50 mg) by mouth every 6 hours as needed for itching 90 tablet 0     ibuprofen (ADVIL,MOTRIN) 800 MG tablet TAKE 1 TABLET (800 MG) BY MOUTH 3 TIMES DAILY AS NEEDED 90 tablet 3     lisinopril-hydrochlorothiazide (PRINZIDE/ZESTORETIC) 10-12.5 MG tablet Take 1 tablet by mouth daily       multivitamin w/minerals (THERA-VIT-M) tablet Take 1 tablet by mouth daily       primidone (MYSOLINE) 250 MG tablet TAKE 1 TABLET (250 MG) BY MOUTH 4 TIMES DAILY 360 tablet 3     SENNA-docusate sodium (SENNA S) 8.6-50 MG tablet Take 2 tablets by mouth 2 times daily 50 tablet 0     aspirin (ASA) 81 MG chewable tablet Take 1 tablet (81 mg) by mouth 2 times daily (Patient not taking: Reported on 8/13/2021) 60 tablet 0     Assessment and Plan:    Migraines: Patient has a history of migraine chronic migraines.  His migraine headache was selected different this time with being right-sided and much more severe with significant light sensitivity.  He went to ER and had Toradol.  Head CT was negative.  It was very severe for 5 days and is now improving.  He is getting Emgality every month.  This has decreased his headaches by 50%.      -Continue Emgality 1 mL every 28 days.  -Follow-up with primary care physician for headaches.          As described above, I met with the patient for 12 minutes and during this time counseling was within 50% of the visit time.  Nerissa Wooten MD                      Again, thank you for allowing me  to participate in the care of your patient.      Sincerely,    Nerissa Wooten MD

## 2022-01-21 ENCOUNTER — MYC MEDICAL ADVICE (OUTPATIENT)
Dept: NEUROLOGY | Facility: CLINIC | Age: 52
End: 2022-01-21
Payer: COMMERCIAL

## 2022-01-21 NOTE — LETTER
1/21/2022       RE: Michele Lezama  [address] [address]          To Whom It May Concern,       Michele Lezama is under my medical supervision for epilepsy. His symptoms of seizures are controlled on anti-seizure medications. He has no work restrictions, including driving or operating heavy machinery.          Sincerely,       Nerissa Wooten MD

## 2022-01-23 ENCOUNTER — HEALTH MAINTENANCE LETTER (OUTPATIENT)
Age: 52
End: 2022-01-23

## 2022-01-24 ENCOUNTER — TELEPHONE (OUTPATIENT)
Dept: NEUROLOGY | Facility: CLINIC | Age: 52
End: 2022-01-24
Payer: COMMERCIAL

## 2022-01-24 NOTE — TELEPHONE ENCOUNTER
Health Call Center    Phone Message    May a detailed message be left on voicemail: yes     Reason for Call: Form or Letter   Type or form/letter needing completion: Letter for pt's employer regarding pt's epilepsy  Provider: Dr. Wooten  Date form needed: asap  Once completed: please put in pt's MyChart so pt can retrieve the letter from his MyChart. Thank you.    Pt reporting that he has a new supervisor at work and that supervisor is NOT aware of pt's condition. 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 Thank you.    Please call pt if needed.      Action Taken: Message sent to  Neurology    Travel Screening: Not Applicable

## 2022-03-08 ENCOUNTER — DOCUMENTATION ONLY (OUTPATIENT)
Dept: NEUROLOGY | Facility: CLINIC | Age: 52
End: 2022-03-08
Payer: COMMERCIAL

## 2022-03-08 NOTE — PROGRESS NOTES
Filled out and faxed patient FMLA forms back to  FMLA specialist Lori Montgomery at [phone]  Gayle Aguirre on 3/8/2022 at 11:24 AM

## 2022-03-14 ENCOUNTER — TELEPHONE (OUTPATIENT)
Dept: NEUROLOGY | Facility: CLINIC | Age: 52
End: 2022-03-14
Payer: COMMERCIAL

## 2022-03-14 NOTE — TELEPHONE ENCOUNTER
Received completed FMLA paperwork from Jael King RN @ [facility] Neurology RN Care Coordinator.  She needs this signed by Dr. Clark and faxed back to her as soon as possible. Saved to the [employer]. Encounter Routed.  Sole Purcell CMA

## 2022-03-18 ENCOUNTER — VIRTUAL VISIT (OUTPATIENT)
Dept: NEUROLOGY | Facility: CLINIC | Age: 52
End: 2022-03-18
Payer: COMMERCIAL

## 2022-03-18 DIAGNOSIS — G43.011 INTRACTABLE MIGRAINE WITHOUT AURA AND WITH STATUS MIGRAINOSUS: Primary | ICD-10-CM

## 2022-03-18 PROCEDURE — 99213 OFFICE O/P EST LOW 20 MIN: CPT | Mod: GT | Performed by: PSYCHIATRY & NEUROLOGY

## 2022-03-18 RX ORDER — SUMATRIPTAN 50 MG/1
50 TABLET, FILM COATED ORAL
Qty: 18 TABLET | Refills: 5 | Status: SHIPPED | OUTPATIENT
Start: 2022-03-18 | End: 2023-03-28

## 2022-03-18 NOTE — TELEPHONE ENCOUNTER
Signed FMLA paperwork received from [facility]. Writer faxed to number listed in form and confirmed fax was sent successfully via right fax. Pt informed via my chart that paperwork has been faxed. He may reach out if there are any concerns.     Evelyn Santos, RNCC  Neurology/Neurosurgery/PM&R

## 2022-03-18 NOTE — LETTER
3/18/2022         RE: Michele Lezama  [address] [facility] [address]        Dear Colleague,    Thank you for referring your patient, Michele Lezama, to the [facility] NEUROLOGY CLINIC [place]. Please see a copy of my visit note below.    Michele is a 51 year old who is being evaluated via a billable video visit.      How would you like to obtain your AVS? MyChart  If the video visit is dropped, the invitation should be resent by: Text to cell phone: [phone]  Will anyone else be joining your video visit? No      Video Start Time: 2:06  Video-Visit Details    Type of service:  Video Visit    Video End Time:2:25    Originating Location (pt. Location): Home    Distant Location (provider location):  [facility] NEUROLOGY [facility]     Platform used for Video Visit: [employer]      NEUROLOGY PROGRESS NOTE   [facility]    Patient:Michele Lezama  : 1970  Age: 51 year old  Today's virtual visit: 2022    History of present illness:     Michele is participating in this virtual visit for follow-up for his migraines.  I saw him 10/2021.  His headaches increased to twice a week. Excedrin doesn't help anymore.   He usually has right sided migraines; but significantly changed and sometimes is behind his left ear and left eye.  He denies recent head injury or illness including Covid.  He has been on Emgality 1 mL every 28 days.    Current Outpatient Medications   Medication Sig Dispense Refill     acetaminophen (TYLENOL) 325 MG tablet Take 2 tablets (650 mg) by mouth every 4 hours as needed for other (For optimal non-opioid multimodal pain management to improve pain control.) 30 tablet 0     carBAMazepine (TEGRETOL) 200 MG tablet TAKE 2 TABLETS (400 MG) BY MOUTH 4 TIMES DAILY 720 tablet 3     galcanezumab-gnlm (EMGALITY) 120 MG/ML injection Inject 1 mL (120 mg) Subcutaneous every 28 days 1 mL 11     glucosamine-chondroitin 500-400 MG CAPS per capsule Take 2 capsules by mouth daily        hydrOXYzine (ATARAX) 25 MG tablet Take 2 tablets (50 mg) by mouth every 6 hours as needed for itching 90 tablet 0     ibuprofen (ADVIL,MOTRIN) 800 MG tablet TAKE 1 TABLET (800 MG) BY MOUTH 3 TIMES DAILY AS NEEDED 90 tablet 3     lisinopril-hydrochlorothiazide (PRINZIDE/ZESTORETIC) 10-12.5 MG tablet Take 1 tablet by mouth daily       multivitamin w/minerals (THERA-VIT-M) tablet Take 1 tablet by mouth daily       primidone (MYSOLINE) 250 MG tablet TAKE 1 TABLET (250 MG) BY MOUTH 4 TIMES DAILY 360 tablet 3       Exam:    There were no vitals taken for this visit.     Wt Readings from Last 5 Encounters:   07/26/21 142.4 kg (313 lb 14.4 oz)   06/04/21 144.7 kg (319 lb)   11/04/20 147.4 kg (325 lb)   08/20/19 146.2 kg (322 lb 4.8 oz)   05/15/19 142.3 kg (313 lb 12.8 oz)     Alert, awake, NAD, no aphasia no dysarthria, EOMI, face is symmetric, moves upper extremities against gravity.    Assessment/Plan:     Migraines: Patient has a history of migraine chronic migraines.  He is getting Emgality monthly.  He was using Excedrin as needed.  He has headaches 2 times a week.  I discussed taking sumatriptan as needed Excedrin.    -Continue Emgality 1 mL every 28 days.  -Take sumatriptan 50 mg at onset of headache, may repeat once in 2 hours.  Do not exceed 2 tablets in 24 hours and 4 tabs in 1 week.  -Follow-up in 3 months.      As described above, I met with the patient for 19 minutes and during this time counseling was greater than 50% of the visit time. This note was created in part by the use of Dragon voice recognition system. Inadvertent grammatical errors and typographical errors may still exist.  Nerissa Wooten MD        Again, thank you for allowing me to participate in the care of your patient.        Sincerely,        Nerissa Wooten MD

## 2022-03-18 NOTE — LETTER
Date:March 25, 2022      Patient was self referred, no letter generated. Do not send.        [facility] Health Information

## 2022-03-18 NOTE — PROGRESS NOTES
Michele is a 51 year old who is being evaluated via a billable video visit.      How would you like to obtain your AVS? MyChart  If the video visit is dropped, the invitation should be resent by: Text to cell phone: [phone]  Will anyone else be joining your video visit? No      Video Start Time: 2:06  Video-Visit Details    Type of service:  Video Visit    Video End Time:2:25    Originating Location (pt. Location): Home    Distant Location (provider location):  [facility] NEUROLOGY CLINIC [place]     Platform used for Video Visit: [facility]      NEUROLOGY PROGRESS NOTE   [facility]    Patient:Michele Lezama  : 1970  Age: 51 year old  Today's virtual visit: 2022    History of present illness:     Michele is participating in this virtual visit for follow-up for his migraines.  I saw him 10/2021.  His headaches increased to twice a week. Excedrin doesn't help anymore.   He usually has right sided migraines; but significantly changed and sometimes is behind his left ear and left eye.  He denies recent head injury or illness including Covid.  He has been on Emgality 1 mL every 28 days.    Current Outpatient Medications   Medication Sig Dispense Refill     acetaminophen (TYLENOL) 325 MG tablet Take 2 tablets (650 mg) by mouth every 4 hours as needed for other (For optimal non-opioid multimodal pain management to improve pain control.) 30 tablet 0     carBAMazepine (TEGRETOL) 200 MG tablet TAKE 2 TABLETS (400 MG) BY MOUTH 4 TIMES DAILY 720 tablet 3     galcanezumab-gnlm (EMGALITY) 120 MG/ML injection Inject 1 mL (120 mg) Subcutaneous every 28 days 1 mL 11     glucosamine-chondroitin 500-400 MG CAPS per capsule Take 2 capsules by mouth daily       hydrOXYzine (ATARAX) 25 MG tablet Take 2 tablets (50 mg) by mouth every 6 hours as needed for itching 90 tablet 0     ibuprofen (ADVIL,MOTRIN) 800 MG tablet TAKE 1 TABLET (800 MG) BY MOUTH 3 TIMES DAILY AS NEEDED 90 tablet 3      lisinopril-hydrochlorothiazide (PRINZIDE/ZESTORETIC) 10-12.5 MG tablet Take 1 tablet by mouth daily       multivitamin w/minerals (THERA-VIT-M) tablet Take 1 tablet by mouth daily       primidone (MYSOLINE) 250 MG tablet TAKE 1 TABLET (250 MG) BY MOUTH 4 TIMES DAILY 360 tablet 3       Exam:    There were no vitals taken for this visit.     Wt Readings from Last 5 Encounters:   07/26/21 142.4 kg (313 lb 14.4 oz)   06/04/21 144.7 kg (319 lb)   11/04/20 147.4 kg (325 lb)   08/20/19 146.2 kg (322 lb 4.8 oz)   05/15/19 142.3 kg (313 lb 12.8 oz)     Alert, awake, NAD, no aphasia no dysarthria, EOMI, face is symmetric, moves upper extremities against gravity.    Assessment/Plan:     Migraines: Patient has a history of migraine chronic migraines.  He is getting Emgality monthly.  He was using Excedrin as needed.  He has headaches 2 times a week.  I discussed taking sumatriptan as needed Excedrin.    -Continue Emgality 1 mL every 28 days.  -Take sumatriptan 50 mg at onset of headache, may repeat once in 2 hours.  Do not exceed 2 tablets in 24 hours and 4 tabs in 1 week.  -Follow-up in 3 months.      As described above, I met with the patient for 19 minutes and during this time counseling was greater than 50% of the visit time. This note was created in part by the use of Dragon voice recognition system. Inadvertent grammatical errors and typographical errors may still exist.  Nerissa Wooten MD

## 2022-09-04 ENCOUNTER — HEALTH MAINTENANCE LETTER (OUTPATIENT)
Age: 52
End: 2022-09-04

## 2023-02-04 ENCOUNTER — APPOINTMENT (OUTPATIENT)
Dept: GENERAL RADIOLOGY | Facility: CLINIC | Age: 53
End: 2023-02-04
Attending: EMERGENCY MEDICINE
Payer: COMMERCIAL

## 2023-02-04 ENCOUNTER — APPOINTMENT (OUTPATIENT)
Dept: CT IMAGING | Facility: CLINIC | Age: 53
End: 2023-02-04
Attending: EMERGENCY MEDICINE
Payer: COMMERCIAL

## 2023-02-04 ENCOUNTER — HOSPITAL ENCOUNTER (EMERGENCY)
Facility: CLINIC | Age: 53
Discharge: HOME | End: 2023-02-04
Attending: EMERGENCY MEDICINE | Admitting: EMERGENCY MEDICINE
Payer: COMMERCIAL

## 2023-02-04 VITALS
SYSTOLIC BLOOD PRESSURE: 139 MMHG | DIASTOLIC BLOOD PRESSURE: 75 MMHG | WEIGHT: 315 LBS | HEART RATE: 65 BPM | HEIGHT: 69 IN | OXYGEN SATURATION: 98 % | RESPIRATION RATE: 20 BRPM | BODY MASS INDEX: 46.65 KG/M2 | TEMPERATURE: 97 F

## 2023-02-04 DIAGNOSIS — T14.8XXA ABRASION: ICD-10-CM

## 2023-02-04 DIAGNOSIS — S43.409A SPRAIN OF SHOULDER, UNSPECIFIED LATERALITY, UNSPECIFIED SHOULDER SPRAIN TYPE, INITIAL ENCOUNTER: ICD-10-CM

## 2023-02-04 DIAGNOSIS — S63.509A SPRAIN OF WRIST, UNSPECIFIED LATERALITY, INITIAL ENCOUNTER: ICD-10-CM

## 2023-02-04 DIAGNOSIS — S00.93XA CONTUSION OF HEAD, UNSPECIFIED PART OF HEAD, INITIAL ENCOUNTER: ICD-10-CM

## 2023-02-04 DIAGNOSIS — S16.1XXA STRAIN OF NECK MUSCLE, INITIAL ENCOUNTER: ICD-10-CM

## 2023-02-04 DIAGNOSIS — S93.409A SPRAIN OF ANKLE, UNSPECIFIED LATERALITY, UNSPECIFIED LIGAMENT, INITIAL ENCOUNTER: ICD-10-CM

## 2023-02-04 PROCEDURE — 73610 X-RAY EXAM OF ANKLE: CPT | Mod: RT

## 2023-02-04 PROCEDURE — 72125 CT NECK SPINE W/O DYE: CPT

## 2023-02-04 PROCEDURE — 73110 X-RAY EXAM OF WRIST: CPT | Mod: RT

## 2023-02-04 PROCEDURE — 73030 X-RAY EXAM OF SHOULDER: CPT | Mod: RT

## 2023-02-04 PROCEDURE — 70450 CT HEAD/BRAIN W/O DYE: CPT

## 2023-02-04 PROCEDURE — 99285 EMERGENCY DEPT VISIT HI MDM: CPT | Mod: 25

## 2023-02-04 PROCEDURE — 250N000011 HC RX IP 250 OP 636: Performed by: EMERGENCY MEDICINE

## 2023-02-04 RX ORDER — ONDANSETRON 4 MG/1
4 TABLET, ORALLY DISINTEGRATING ORAL ONCE
Status: COMPLETED | OUTPATIENT
Start: 2023-02-04 | End: 2023-02-04

## 2023-02-04 RX ADMIN — ONDANSETRON 4 MG: 4 TABLET, ORALLY DISINTEGRATING ORAL at 01:35

## 2023-02-04 ASSESSMENT — ACTIVITIES OF DAILY LIVING (ADL): ADLS_ACUITY_SCORE: 35

## 2023-02-04 NOTE — ED PROVIDER NOTES
History     Chief Complaint:  Fall       HPI   Michele Lezama is a 52 year old male who presents with fall.  He states that about 5:30 PM he tripped on items on the floor in his living room and fell onto his face.  He denies loss of consciousness but has been feeling dizzy with headache as well as pain to his right wrist right ankle and right shoulder and neck pain.  He denies weakness or numbness.  Headache is moderate in sensation with no photophobia or nausea vomiting.                Allergies:  Hydrocodone-Acetaminophen     Medications:    acetaminophen (TYLENOL) 325 MG tablet  carBAMazepine (TEGRETOL) 200 MG tablet  galcanezumab-gnlm (EMGALITY) 120 MG/ML injection  glucosamine-chondroitin 500-400 MG CAPS per capsule  hydrOXYzine (ATARAX) 25 MG tablet  ibuprofen (ADVIL,MOTRIN) 800 MG tablet  lisinopril-hydrochlorothiazide (PRINZIDE/ZESTORETIC) 10-12.5 MG tablet  multivitamin w/minerals (THERA-VIT-M) tablet  primidone (MYSOLINE) 250 MG tablet  SUMAtriptan (IMITREX) 50 MG tablet        Past Medical History:    Past Medical History:   Diagnosis Date     Arthritis      GERD (gastroesophageal reflux disease)      GERD (gastroesophageal reflux disease)      Hypertension      Hypertension      Migraine      Migraine      Morbid obesity (H)      Obese      Osteoarthritis      Seizure, petit mal (H) 2008     Seizures (H)      Sleep apnea      Sleep apnea        Past Surgical History:    Past Surgical History:   Procedure Laterality Date     ARTHROPLASTY KNEE Left 7/26/2021    Procedure: LEFT TOTAL KNEE ARTHROPLASTY;  Surgeon: Karri Ceja MD;  Location: [facility] OR     ARTHROSCOPY KNEE       EXAM UNDER ANESTHESIA, MANIPULATE JOINT (LOCATION) Right 7/26/2021    Procedure: RIGHT KNEE MANIPULATION;  Surgeon: Karri Ceja MD;  Location: [facility] OR     EYE SURGERY       HC KNEE SCOPE,SINGLE MENISECTOMY Left 12/13/2016    Procedure: KNEE ARTHROSCOPY WITH MENISECTOMY, LEFT, PARTIAL MEDIAL MENISECTOMY ;   "Surgeon: Karri Ceja MD;  Location: [facility];  Service: Orthopedics     OTHER SURGICAL HISTORY      carpel tunnel     TONSILLECTOMY       ZZC TOTAL KNEE ARTHROPLASTY Right 11/4/2020    Procedure: RIGHT TOTAL KNEE ARTHROPLASTY;  Surgeon: Karri Ceja MD;  Location: [facility];  Service: Orthopedics        Family History:    family history is not on file.    Social History:   reports that he has never smoked. He has never used smokeless tobacco. He reports that he does not drink alcohol and does not use drugs.  PCP: No Ref-Primary, Physician     Physical Exam     Patient Vitals for the past 24 hrs:   BP Temp Temp src Pulse Resp SpO2 Height Weight   02/04/23 0128 139/75 -- -- -- -- -- -- --   02/04/23 0127 -- 97  F (36.1  C) Temporal 65 20 98 % 1.753 m (5' 9\") 147.4 kg (325 lb)        Physical Exam  Constitutional:  Oriented to person, place, and time.   HENT:                          TMs are clear bilaterally, no septal hematoma mild tenderness to his nasal bridge with no deformity.  Head:    Atraumatic  Mouth/Throat:   Oropharynx is clear and moist.   Eyes:    EOM are normal. Pupils are equal, round, and reactive to light.   Neck:    Neck supple.   Cardiovascular:  Normal rate, regular rhythm and normal heart sounds.      Exam reveals no gallop and no friction rub.       No murmur heard.  Pulmonary/Chest:  Effort normal and breath sounds normal.      No respiratory distress. No wheezes. No rales.      No reproducible chest wall pain.  Abdominal:   Soft. No distension. No tenderness. No rebound and no guarding.   Musculoskeletal:  Normal range of motion.  No midline spinal tenderness.  Mild tenderness to his right wrist right shoulder and right ankle with full range of motion.  Neurological:   Alert and oriented to person, place, and time.           Moves all 4 extremities spontaneously    Skin:    No rash noted. No pallor.  Mild superficial abrasions noted to his right forehead and right " forearm.  No laceration.      Emergency Department Course     Imaging:  XR Wrist Right G/E 3 Views   Final Result   IMPRESSION: No acute fracture or dislocation. Old, healed fifth metacarpal fracture.      XR Shoulder Right G/E 3 Views   Final Result   IMPRESSION: No acute fracture or dislocation. Arthritis in the AC joint and glenohumeral joint.      Ankle XR, G/E 3 views, right   Final Result   IMPRESSION: No acute fracture or dislocation.      Head CT w/o contrast   Final Result   IMPRESSION:   1.  No acute intracranial process.   2.  Dural based ossification along the lateral margin of the right temporal lobe, suggestive of a calcified meningioma.      Cervical spine CT w/o contrast   Final Result   IMPRESSION:   1.  No evidence of an acute displaced fracture.         Report per radiology          Emergency Department Course:             Interventions:  Medications   ondansetron (ZOFRAN ODT) ODT tab 4 mg (4 mg Oral Given 23 0135)        Independent Interpretation (X-rays, CTs, rhythm strip):  I reviewed x-rays of the right wrist right ankle and right shoulder and noted no acute fracture and agree with radiologist rotation.          Disposition:  The patient was discharged to home.     Impression & Plan        Medical Decision Makin-year-old male that came in status post fall complaining of head injury, nasal injury, neck injury, right wrist, right shoulder, right ankle.  He also had abrasions that were noted his tetanus is up-to-date with no laceration.  I did end up getting a CT of his head and cervical spine which was fortunately negative for fracture or intracranial hemorrhage.  He has no weakness or numbness no concern for neurologic compromise.  I did and obtain x-rays of his right wrist shoulder and ankle that are fortunate negative for fracture.  He likely has minor concussion.  He told to ice weight-bear as tolerated continue ibuprofen follow-up with primary care doctor return for any worsening  symptoms or concerns.        Diagnosis:    ICD-10-CM    1. Contusion of head, unspecified part of head, initial encounter  S00.93XA       2. Strain of neck muscle, initial encounter  S16.1XXA       3. Sprain of wrist, unspecified laterality, initial encounter  S63.509A       4. Sprain of ankle, unspecified laterality, unspecified ligament, initial encounter  S93.409A       5. Sprain of shoulder, unspecified laterality, unspecified shoulder sprain type, initial encounter  S43.409A       6. Abrasion  T14.8XXA            Discharge Medications:  New Prescriptions    No medications on file        2/4/2023   Mckay Roberto MD Shapin, Ryan P, MD  02/04/23 0250

## 2023-02-04 NOTE — LETTER
Michele Lezama was seen and treated in our emergency department on 2/4/2023.  Please excuse from work Saturday and Sunday.

## 2023-02-04 NOTE — ED TRIAGE NOTES
Pt arrives to ED after a mechanical fall at 1730. Pt tripped and hit the wood floor with his head and R side. Denies thinners or LOC. Pt has hx epilepsy, seizures controlled. Pt has cuts to forehead and skin tear to R forearm. Pt c/o neck pain, back pain, headache, R wrist, R ankle, nausea, and dizziness. Ibuprofen and Excedrin without relief.

## 2023-03-14 ENCOUNTER — APPOINTMENT (OUTPATIENT)
Dept: CT IMAGING | Facility: CLINIC | Age: 53
End: 2023-03-14
Attending: STUDENT IN AN ORGANIZED HEALTH CARE EDUCATION/TRAINING PROGRAM
Payer: COMMERCIAL

## 2023-03-14 ENCOUNTER — HOSPITAL ENCOUNTER (EMERGENCY)
Facility: CLINIC | Age: 53
Discharge: HOME | End: 2023-03-14
Attending: EMERGENCY MEDICINE | Admitting: EMERGENCY MEDICINE
Payer: COMMERCIAL

## 2023-03-14 ENCOUNTER — APPOINTMENT (OUTPATIENT)
Dept: GENERAL RADIOLOGY | Facility: CLINIC | Age: 53
End: 2023-03-14
Attending: EMERGENCY MEDICINE
Payer: COMMERCIAL

## 2023-03-14 VITALS
RESPIRATION RATE: 27 BRPM | TEMPERATURE: 96.9 F | DIASTOLIC BLOOD PRESSURE: 83 MMHG | OXYGEN SATURATION: 96 % | WEIGHT: 315 LBS | SYSTOLIC BLOOD PRESSURE: 133 MMHG | BODY MASS INDEX: 49.09 KG/M2 | HEART RATE: 64 BPM

## 2023-03-14 DIAGNOSIS — R07.89 CHEST WALL TENDERNESS: ICD-10-CM

## 2023-03-14 DIAGNOSIS — R07.9 CHEST PAIN: ICD-10-CM

## 2023-03-14 LAB
ANION GAP SERPL CALCULATED.3IONS-SCNC: 8 MMOL/L (ref 7–15)
ATRIAL RATE - MUSE: 58 BPM
BASOPHILS # BLD AUTO: 0.1 10E3/UL (ref 0–0.2)
BASOPHILS NFR BLD AUTO: 1 %
BUN SERPL-MCNC: 25.4 MG/DL (ref 6–20)
CALCIUM SERPL-MCNC: 9.1 MG/DL (ref 8.6–10)
CHLORIDE SERPL-SCNC: 106 MMOL/L (ref 98–107)
CREAT SERPL-MCNC: 0.88 MG/DL (ref 0.67–1.17)
D DIMER PPP FEU-MCNC: 0.64 UG/ML FEU (ref 0–0.5)
DEPRECATED HCO3 PLAS-SCNC: 29 MMOL/L (ref 22–29)
DIASTOLIC BLOOD PRESSURE - MUSE: NORMAL MMHG
EOSINOPHIL # BLD AUTO: 0.1 10E3/UL (ref 0–0.7)
EOSINOPHIL NFR BLD AUTO: 2 %
ERYTHROCYTE [DISTWIDTH] IN BLOOD BY AUTOMATED COUNT: 14 % (ref 10–15)
FLUAV RNA SPEC QL NAA+PROBE: NEGATIVE
FLUBV RNA RESP QL NAA+PROBE: NEGATIVE
GFR SERPL CREATININE-BSD FRML MDRD: >90 ML/MIN/1.73M2
GLUCOSE SERPL-MCNC: 93 MG/DL (ref 70–99)
HCT VFR BLD AUTO: 43.3 % (ref 40–53)
HGB BLD-MCNC: 14.2 G/DL (ref 13.3–17.7)
IMM GRANULOCYTES # BLD: 0 10E3/UL
IMM GRANULOCYTES NFR BLD: 0 %
INTERPRETATION ECG - MUSE: NORMAL
LYMPHOCYTES # BLD AUTO: 2.6 10E3/UL (ref 0.8–5.3)
LYMPHOCYTES NFR BLD AUTO: 30 %
MCH RBC QN AUTO: 31.1 PG (ref 26.5–33)
MCHC RBC AUTO-ENTMCNC: 32.8 G/DL (ref 31.5–36.5)
MCV RBC AUTO: 95 FL (ref 78–100)
MONOCYTES # BLD AUTO: 0.8 10E3/UL (ref 0–1.3)
MONOCYTES NFR BLD AUTO: 9 %
NEUTROPHILS # BLD AUTO: 5 10E3/UL (ref 1.6–8.3)
NEUTROPHILS NFR BLD AUTO: 58 %
NRBC # BLD AUTO: 0 10E3/UL
NRBC BLD AUTO-RTO: 0 /100
P AXIS - MUSE: 62 DEGREES
PLATELET # BLD AUTO: 246 10E3/UL (ref 150–450)
POTASSIUM SERPL-SCNC: 3.9 MMOL/L (ref 3.4–5.3)
PR INTERVAL - MUSE: 216 MS
QRS DURATION - MUSE: 92 MS
QT - MUSE: 412 MS
QTC - MUSE: 404 MS
R AXIS - MUSE: 0 DEGREES
RBC # BLD AUTO: 4.57 10E6/UL (ref 4.4–5.9)
RSV RNA SPEC NAA+PROBE: NEGATIVE
SARS-COV-2 RNA RESP QL NAA+PROBE: NEGATIVE
SODIUM SERPL-SCNC: 143 MMOL/L (ref 136–145)
SYSTOLIC BLOOD PRESSURE - MUSE: NORMAL MMHG
T AXIS - MUSE: 33 DEGREES
TROPONIN T SERPL HS-MCNC: 10 NG/L
TROPONIN T SERPL HS-MCNC: 9 NG/L
VENTRICULAR RATE- MUSE: 58 BPM
WBC # BLD AUTO: 8.6 10E3/UL (ref 4–11)

## 2023-03-14 PROCEDURE — 71046 X-RAY EXAM CHEST 2 VIEWS: CPT

## 2023-03-14 PROCEDURE — 80048 BASIC METABOLIC PNL TOTAL CA: CPT | Performed by: EMERGENCY MEDICINE

## 2023-03-14 PROCEDURE — 36415 COLL VENOUS BLD VENIPUNCTURE: CPT | Performed by: STUDENT IN AN ORGANIZED HEALTH CARE EDUCATION/TRAINING PROGRAM

## 2023-03-14 PROCEDURE — 258N000003 HC RX IP 258 OP 636: Performed by: EMERGENCY MEDICINE

## 2023-03-14 PROCEDURE — 36415 COLL VENOUS BLD VENIPUNCTURE: CPT | Performed by: EMERGENCY MEDICINE

## 2023-03-14 PROCEDURE — 250N000011 HC RX IP 250 OP 636: Performed by: EMERGENCY MEDICINE

## 2023-03-14 PROCEDURE — 85379 FIBRIN DEGRADATION QUANT: CPT | Performed by: STUDENT IN AN ORGANIZED HEALTH CARE EDUCATION/TRAINING PROGRAM

## 2023-03-14 PROCEDURE — 93005 ELECTROCARDIOGRAM TRACING: CPT

## 2023-03-14 PROCEDURE — 84484 ASSAY OF TROPONIN QUANT: CPT | Performed by: EMERGENCY MEDICINE

## 2023-03-14 PROCEDURE — 85025 COMPLETE CBC W/AUTO DIFF WBC: CPT | Performed by: EMERGENCY MEDICINE

## 2023-03-14 PROCEDURE — 71275 CT ANGIOGRAPHY CHEST: CPT

## 2023-03-14 PROCEDURE — C9803 HOPD COVID-19 SPEC COLLECT: HCPCS

## 2023-03-14 PROCEDURE — 250N000013 HC RX MED GY IP 250 OP 250 PS 637: Performed by: STUDENT IN AN ORGANIZED HEALTH CARE EDUCATION/TRAINING PROGRAM

## 2023-03-14 PROCEDURE — 96360 HYDRATION IV INFUSION INIT: CPT | Mod: 59

## 2023-03-14 PROCEDURE — 87637 SARSCOV2&INF A&B&RSV AMP PRB: CPT | Performed by: STUDENT IN AN ORGANIZED HEALTH CARE EDUCATION/TRAINING PROGRAM

## 2023-03-14 PROCEDURE — 99285 EMERGENCY DEPT VISIT HI MDM: CPT | Mod: CS,25

## 2023-03-14 PROCEDURE — 84484 ASSAY OF TROPONIN QUANT: CPT | Mod: 91 | Performed by: STUDENT IN AN ORGANIZED HEALTH CARE EDUCATION/TRAINING PROGRAM

## 2023-03-14 RX ORDER — CARBAMAZEPINE 200 MG/1
400 TABLET ORAL ONCE
Status: COMPLETED | OUTPATIENT
Start: 2023-03-14 | End: 2023-03-14

## 2023-03-14 RX ORDER — IBUPROFEN 600 MG/1
600 TABLET, FILM COATED ORAL ONCE
Status: COMPLETED | OUTPATIENT
Start: 2023-03-14 | End: 2023-03-14

## 2023-03-14 RX ORDER — IOPAMIDOL 755 MG/ML
500 INJECTION, SOLUTION INTRAVASCULAR ONCE
Status: COMPLETED | OUTPATIENT
Start: 2023-03-14 | End: 2023-03-14

## 2023-03-14 RX ORDER — PRIMIDONE 250 MG/1
250 TABLET ORAL ONCE
Status: COMPLETED | OUTPATIENT
Start: 2023-03-14 | End: 2023-03-14

## 2023-03-14 RX ADMIN — SODIUM CHLORIDE 100 ML: 9 INJECTION, SOLUTION INTRAVENOUS at 10:00

## 2023-03-14 RX ADMIN — CARBAMAZEPINE 400 MG: 200 TABLET ORAL at 10:11

## 2023-03-14 RX ADMIN — PRIMIDONE 250 MG: 250 TABLET ORAL at 10:11

## 2023-03-14 RX ADMIN — IBUPROFEN 600 MG: 600 TABLET, FILM COATED ORAL at 08:57

## 2023-03-14 RX ADMIN — IOPAMIDOL 90 ML: 755 INJECTION, SOLUTION INTRAVENOUS at 10:00

## 2023-03-14 ASSESSMENT — ENCOUNTER SYMPTOMS
DIARRHEA: 0
LIGHT-HEADEDNESS: 0
VOMITING: 0
FEVER: 0
NAUSEA: 0
COUGH: 0
CHILLS: 0
WHEEZING: 0
ABDOMINAL PAIN: 0
PALPITATIONS: 0
HEADACHES: 1
CHEST TIGHTNESS: 0
DIZZINESS: 0
CONSTIPATION: 0
SHORTNESS OF BREATH: 1

## 2023-03-14 ASSESSMENT — ACTIVITIES OF DAILY LIVING (ADL)
ADLS_ACUITY_SCORE: 35
ADLS_ACUITY_SCORE: 35

## 2023-03-14 NOTE — ED PROVIDER NOTES
"  History     Chief Complaint:  Chest Pain       HPI   Michele Lezama is a 52 year old male with a history of ALBERTO, HTN, epilepsy, who presents with chest pain. Pain woke patient from sleep at 3AM, states he was dreaming he was running and suddenly woke up short of breath with sharp left sided chest pain. Pain remains, described now as \"soreness\". Denies radiation into arms, neck, or back. Shortness of breath described as \"laborous\", not pleuritic. Denies cough, sore throat, URI symptoms, nausea, vomiting, diarrhea, constipation, urinary symptoms, abdominal pain. States he thinks he's has similar pain episode in 2020 for which he received EKG. Unsure of outcome of that episode. Non smoker, no recent travels, no recent surgeries or immobilization.      Independent Historian:   None - Patient Only    Review of External Notes: None     ROS:  Review of Systems   Constitutional: Negative for chills and fever.   Respiratory: Positive for shortness of breath. Negative for cough, chest tightness and wheezing.    Cardiovascular: Positive for chest pain. Negative for palpitations and leg swelling.   Gastrointestinal: Negative for abdominal pain, constipation, diarrhea, nausea and vomiting.   Neurological: Positive for headaches. Negative for dizziness and light-headedness.       Allergies:  Hydrocodone-Acetaminophen     Medications:    acetaminophen (TYLENOL) 325 MG tablet  carBAMazepine (TEGRETOL) 200 MG tablet  galcanezumab-gnlm (EMGALITY) 120 MG/ML injection  glucosamine-chondroitin 500-400 MG CAPS per capsule  hydrOXYzine (ATARAX) 25 MG tablet  ibuprofen (ADVIL,MOTRIN) 800 MG tablet  lisinopril-hydrochlorothiazide (PRINZIDE/ZESTORETIC) 10-12.5 MG tablet  multivitamin w/minerals (THERA-VIT-M) tablet  primidone (MYSOLINE) 250 MG tablet  SUMAtriptan (IMITREX) 50 MG tablet        Past Medical History:    Past Medical History:   Diagnosis Date     Arthritis      GERD (gastroesophageal reflux disease)      GERD (gastroesophageal " reflux disease)      Hypertension      Hypertension      Migraine      Migraine      Morbid obesity (H)      Obese      Osteoarthritis      Seizure, petit mal (H) 2008     Seizures (H)      Sleep apnea      Sleep apnea        Past Surgical History:    Past Surgical History:   Procedure Laterality Date     ARTHROPLASTY KNEE Left 7/26/2021    Procedure: LEFT TOTAL KNEE ARTHROPLASTY;  Surgeon: Karri Ceja MD;  Location: [facility] OR     ARTHROSCOPY KNEE       EXAM UNDER ANESTHESIA, MANIPULATE JOINT (LOCATION) Right 7/26/2021    Procedure: RIGHT KNEE MANIPULATION;  Surgeon: Karri Ceja MD;  Location: [facility] OR     EYE SURGERY       HC KNEE SCOPE,SINGLE MENISECTOMY Left 12/13/2016    Procedure: KNEE ARTHROSCOPY WITH MENISECTOMY, LEFT, PARTIAL MEDIAL MENISECTOMY ;  Surgeon: Karri Ceja MD;  Location: [facility] OR;  Service: Orthopedics     OTHER SURGICAL HISTORY      carpel tunnel     TONSILLECTOMY       ZZC TOTAL KNEE ARTHROPLASTY Right 11/4/2020    Procedure: RIGHT TOTAL KNEE ARTHROPLASTY;  Surgeon: Karri Ceja MD;  Location: [facility];  Service: Orthopedics        Family History:    family history is not on file.    Social History:   reports that he has never smoked. He has never used smokeless tobacco. He reports that he does not drink alcohol and does not use drugs.  PCP: [facility] (Inactive)     Physical Exam     Patient Vitals for the past 24 hrs:   BP Temp Temp src Pulse Resp SpO2 Weight   03/14/23 0930 (!) 140/78 -- -- 64 28 97 % --   03/14/23 0915 135/81 -- -- 64 24 98 % --   03/14/23 0914 135/81 -- -- 58 12 97 % --   03/14/23 0900 125/76 -- -- 56 23 96 % --   03/14/23 0845 123/78 -- -- 62 -- 97 % --   03/14/23 0830 124/83 -- -- 56 22 98 % --   03/14/23 0630 (!) 162/95 96.9  F (36.1  C) Temporal 55 -- 98 % (!) 150.8 kg (332 lb 7.3 oz)        Physical Exam  General: Alert and cooperative with exam. Patient in no apparent distress. Normal  mentation.  Head:  Scalp is NC/AT  Eyes:  No scleral icterus, PERRL  ENT:  The external nose and ears are normal. The oropharynx is normal and without erythema; mucus membranes are moist. Uvula midline, no evidence of deep space infection.  Neck:  Normal range of motion without rigidity.  CV:  Regular rate and rhythm    No pathologic murmur     Chest wall tenderness to left side  Resp:  Breath sounds are clear bilaterally    Non-labored, no retractions or accessory muscle use  GI:  Abdomen is soft, no distension, no tenderness. No peritoneal signs  MS:  No lower extremity edema   Skin:  Warm and dry, No rash or lesions noted.  Neuro:  Oriented x 3. No gross motor deficits.      Emergency Department Course   ECG  ECG taken at 0635, ECG read at 0906  Sinus bradycardia with first-degree AV block  Anterior infarct, age undetermined  Abnormal ECG     When compared with prior ECG dated 10/30/2020, there is no significant change noted  Rate 58 bpm. HI interval 216 ms. QRS duration 92 ms. QT/QTc 412/404 ms. P-R-T axes 62 0 33    Imaging:  CT Chest Pulmonary Embolism w Contrast   Preliminary Result   IMPRESSION:  No acute abnormality in the chest. No evidence for   pulmonary embolism.      Chest XR,  PA & LAT   Final Result   IMPRESSION: Mild bibasilar opacities may be related to infection or   atelectasis. Heart size is within normal limits. There is mild   pulmonary venous congestion. No pleural effusions. No pneumothorax.      DAMION SLADE MD            SYSTEM ID:  [id number]         Report per radiology    Laboratory:  Labs Ordered and Resulted from Time of ED Arrival to Time of ED Departure   BASIC METABOLIC PANEL - Abnormal       Result Value    Sodium 143      Potassium 3.9      Chloride 106      Carbon Dioxide (CO2) 29      Anion Gap 8      Urea Nitrogen 25.4 (*)     Creatinine 0.88      Calcium 9.1      Glucose 93      GFR Estimate >90     D DIMER QUANTITATIVE - Abnormal    D-Dimer Quantitative 0.64 (*)     TROPONIN T, HIGH SENSITIVITY - Normal    Troponin T, High Sensitivity 10     INFLUENZA A/B, RSV, & SARS-COV2 PCR - Normal    Influenza A PCR Negative      Influenza B PCR Negative      RSV PCR Negative      SARS CoV2 PCR Negative     TROPONIN T, HIGH SENSITIVITY - Normal    Troponin T, High Sensitivity 9     CBC WITH PLATELETS AND DIFFERENTIAL    WBC Count 8.6      RBC Count 4.57      Hemoglobin 14.2      Hematocrit 43.3      MCV 95      MCH 31.1      MCHC 32.8      RDW 14.0      Platelet Count 246      % Neutrophils 58      % Lymphocytes 30      % Monocytes 9      % Eosinophils 2      % Basophils 1      % Immature Granulocytes 0      NRBCs per 100 WBC 0      Absolute Neutrophils 5.0      Absolute Lymphocytes 2.6      Absolute Monocytes 0.8      Absolute Eosinophils 0.1      Absolute Basophils 0.1      Absolute Immature Granulocytes 0.0      Absolute NRBCs 0.0          Procedures   None    Emergency Department Course & Assessments:      Interventions:  Medications   ibuprofen (ADVIL/MOTRIN) tablet 600 mg (600 mg Oral $Given 3/14/23 0857)   primidone (MYSOLINE) tablet 250 mg (250 mg Oral $Given 3/14/23 1011)   carBAMazepine (TEGretol) tablet 400 mg (400 mg Oral $Given 3/14/23 1011)   0.9% sodium chloride BOLUS (100 mLs Intravenous $New Bag 3/14/23 1000)   iopamidol (ISOVUE-370) solution 500 mL (90 mLs Intravenous $Given 3/14/23 1000)        Independent Interpretation (X-rays, CTs, rhythm strip):  CXR - possible opacities noted, unlikely to be pneumonia due to patient's appearance/labs/vitals    Consultations/Discussion of Management or Tests:     ED Course as of 03/14/23 1101   Tue Mar 14, 2023   0900 Dr. Rincno discussed the case with Mehreen aBshir. Discussed presentation, exam, ddx, plan.   0910 Dr. Rincon' evaluation.   0917 Chest XR,  PA & LAT  No gross infiltrate nor PTX noted.       Social Determinants of Health affecting care:   None    Disposition:  The patient was discharged to home.     Impression & Plan     CMS Diagnoses: None    Medical Decision Making:  Michele Lezama presents with chest pain.  The work up in the Emergency Department is negative.  The differential diagnosis of chest pain is broad and includes life threatening etiologies such as Acute coronary syndrome, Myocardial infarction, Pulmonary Embolism, and Acute Aortic Dissection.  Other causes may include pneumonia, pneumothorax, pericarditis, pleurisy, and esophageal spasm.  No serious etiology for the chest pain was detected today during this visit.  HEART score of 3, low risk for cardiac etiology. Normal troponins, EKG similar to previous without ST elevations, elevated d dimer however CT negative for PE. CXR with possible opacities noted, however patient is asymptomatic in terms of respiratory symptoms, normal WBC count, afebrile, unlikely to be pneumonia. Discussed possibility of musculoskeletal strain as patient's chest is tender to palpation, recommending ibuprofen/tylenol for symptomatic relief and ice or heat as needed.     Close follow up with primary care is indicated should the pain continue, as further work up may be performed; this was made clear to Michele, who understands.      Diagnosis:    ICD-10-CM    1. Chest pain  R07.9       2. Chest wall tenderness  R07.89            Discharge Medications:  New Prescriptions    No medications on file          3/14/2023   SHANTI Barrow Lauren R, PA-C  03/14/23 1104       Luís Rincon DO  03/14/23 1244

## 2023-03-14 NOTE — ED TRIAGE NOTES
3 am woke up with chest pain      Triage Assessment     Row Name 03/14/23 0631       Triage Assessment (Adult)    Airway WDL WDL       Respiratory WDL    Respiratory WDL WDL       Skin Circulation/Temperature WDL    Skin Circulation/Temperature WDL WDL       Cardiac WDL    Cardiac WDL WDL       Peripheral/Neurovascular WDL    Peripheral Neurovascular WDL WDL

## 2023-03-14 NOTE — ED PROVIDER NOTES
Emergency Department Attending Supervision Note  3/14/2023  9:00 AM      I evaluated this patient in conjunction with Mehreen Bashir PA-C.  I have participated in the care of the patient and personally performed key elements of the history, exam, and medical decision making.     Briefly, the patient presented with chest pain with dyspnea. Woke from sleep at 3 AM. No cough.    On my exam,     Patient Vitals for the past 24 hrs:   BP Temp Temp src Pulse Resp SpO2 Weight   03/14/23 1100 133/83 -- -- 64 27 96 % --   03/14/23 1045 129/81 -- -- 62 27 94 % --   03/14/23 1030 (!) 123/91 -- -- 65 29 97 % --   03/14/23 1015 121/76 -- -- 66 25 97 % --   03/14/23 0945 (!) 143/79 -- -- 66 22 95 % --   03/14/23 0930 (!) 140/78 -- -- 64 28 97 % --   03/14/23 0915 135/81 -- -- 64 24 98 % --   03/14/23 0914 135/81 -- -- 58 12 97 % --   03/14/23 0900 125/76 -- -- 56 23 96 % --   03/14/23 0845 123/78 -- -- 62 -- 97 % --   03/14/23 0830 124/83 -- -- 56 22 98 % --   03/14/23 0630 (!) 162/95 96.9  F (36.1  C) Temporal 55 -- 98 % (!) 150.8 kg (332 lb 7.3 oz)       Constitutional: Vital signs reviewed as above.   Head: No external signs of trauma noted.  Eyes: Pupils are equal, round, and reactive to light.   Neck: No JVD noted  Cardiovascular: bradycardic rate, Regular rhythm and normal heart sounds.  No murmur heard. Equal B/L peripheral pulses.  Pulmonary/Chest: Effort normal and breath sounds normal. No respiratory distress. Patient has no wheezes. Patient has no rales.   Gastrointestinal: Soft. There is no tenderness.   Musculoskeletal/Extremities: No pitting edema noted. Normal tone.  Neurological: Patient is alert and oriented to person, place, and time.   Skin: Skin is warm and dry. There is no diaphoresis noted.   Psychiatric: The patient appears calm.      Results:    CT Chest Pulmonary Embolism w Contrast   Preliminary Result   IMPRESSION:  No acute abnormality in the chest. No evidence for   pulmonary embolism.      Chest XR,  PA  & LAT   Final Result   IMPRESSION: Mild bibasilar opacities may be related to infection or   atelectasis. Heart size is within normal limits. There is mild   pulmonary venous congestion. No pleural effusions. No pneumothorax.      DAMION SLADE MD            SYSTEM ID:  [id number]          Labs Ordered and Resulted from Time of ED Arrival to Time of ED Departure   BASIC METABOLIC PANEL - Abnormal       Result Value    Sodium 143      Potassium 3.9      Chloride 106      Carbon Dioxide (CO2) 29      Anion Gap 8      Urea Nitrogen 25.4 (*)     Creatinine 0.88      Calcium 9.1      Glucose 93      GFR Estimate >90     D DIMER QUANTITATIVE - Abnormal    D-Dimer Quantitative 0.64 (*)    TROPONIN T, HIGH SENSITIVITY - Normal    Troponin T, High Sensitivity 10     INFLUENZA A/B, RSV, & SARS-COV2 PCR - Normal    Influenza A PCR Negative      Influenza B PCR Negative      RSV PCR Negative      SARS CoV2 PCR Negative     TROPONIN T, HIGH SENSITIVITY - Normal    Troponin T, High Sensitivity 9     CBC WITH PLATELETS AND DIFFERENTIAL    WBC Count 8.6      RBC Count 4.57      Hemoglobin 14.2      Hematocrit 43.3      MCV 95      MCH 31.1      MCHC 32.8      RDW 14.0      Platelet Count 246      % Neutrophils 58      % Lymphocytes 30      % Monocytes 9      % Eosinophils 2      % Basophils 1      % Immature Granulocytes 0      NRBCs per 100 WBC 0      Absolute Neutrophils 5.0      Absolute Lymphocytes 2.6      Absolute Monocytes 0.8      Absolute Eosinophils 0.1      Absolute Basophils 0.1      Absolute Immature Granulocytes 0.0      Absolute NRBCs 0.0         ED course:    ECG  ECG taken at 0635, ECG read at 0906  Sinus bradycardia with first-degree AV block  Anterior infarct, age undetermined  Abnormal ECG    When compared with prior ECG dated 10/30/2020, there is no significant change noted  Rate 58 bpm. AL interval 216 ms. QRS duration 92 ms. QT/QTc 412/404 ms. P-R-T axes 62 0 33.       Interventions:  Medications   ibuprofen  (ADVIL/MOTRIN) tablet 600 mg (600 mg Oral $Given 3/14/23 0857)   primidone (MYSOLINE) tablet 250 mg (250 mg Oral $Given 3/14/23 1011)   carBAMazepine (TEGretol) tablet 400 mg (400 mg Oral $Given 3/14/23 1011)   0.9% sodium chloride BOLUS (0 mLs Intravenous Stopped 3/14/23 1104)   iopamidol (ISOVUE-370) solution 500 mL (90 mLs Intravenous $Given 3/14/23 1000)        Independent Interpretation (X-rays, CTs, rhythm strip):  CXR: NAD     Consultations/Discussion of Management or Tests:     ED Course as of 03/14/23 1243   Tue Mar 14, 2023   0900 Dr. Rincon discussed the case with Mehreen Bashir. Discussed presentation, exam, ddx, plan.   0910 Dr. Rincon' evaluation.   0917 Chest XR,  PA & LAT  No gross infiltrate nor PTX noted.     Heart Score 3, should be safe for DC    Social Determinants of Health affecting care:  None    Impression:    ICD-10-CM    1. Chest pain  R07.9       2. Chest wall tenderness  R07.89             Luís Rincon, DO   3/14/2023  [facility] EMERGENCY DEPT         Luís Rincon, DO  03/14/23 1243

## 2023-03-24 ENCOUNTER — VIRTUAL VISIT (OUTPATIENT)
Dept: NEUROLOGY | Facility: CLINIC | Age: 53
End: 2023-03-24
Payer: COMMERCIAL

## 2023-03-24 VITALS
DIASTOLIC BLOOD PRESSURE: 80 MMHG | SYSTOLIC BLOOD PRESSURE: 119 MMHG | WEIGHT: 315 LBS | BODY MASS INDEX: 46.52 KG/M2 | HEART RATE: 74 BPM

## 2023-03-24 DIAGNOSIS — G40.209 PARTIAL SYMPTOMATIC EPILEPSY WITH COMPLEX PARTIAL SEIZURES, NOT INTRACTABLE, WITHOUT STATUS EPILEPTICUS (H): ICD-10-CM

## 2023-03-24 DIAGNOSIS — G43.719 INTRACTABLE CHRONIC MIGRAINE WITHOUT AURA AND WITHOUT STATUS MIGRAINOSUS: Primary | ICD-10-CM

## 2023-03-24 PROCEDURE — 99214 OFFICE O/P EST MOD 30 MIN: CPT | Mod: VID | Performed by: PSYCHIATRY & NEUROLOGY

## 2023-03-24 RX ORDER — RIZATRIPTAN BENZOATE 10 MG/1
10 TABLET ORAL
Qty: 12 TABLET | Refills: 5 | Status: SHIPPED | OUTPATIENT
Start: 2023-03-24 | End: 2024-03-08

## 2023-03-24 RX ORDER — PRIMIDONE 250 MG/1
TABLET ORAL
Qty: 360 TABLET | Refills: 3 | Status: SHIPPED | OUTPATIENT
Start: 2023-03-24 | End: 2024-03-08

## 2023-03-24 RX ORDER — CARBAMAZEPINE 200 MG/1
TABLET ORAL
Qty: 720 TABLET | Refills: 3 | Status: SHIPPED | OUTPATIENT
Start: 2023-03-24 | End: 2024-03-08

## 2023-03-24 ASSESSMENT — PAIN SCALES - GENERAL: PAINLEVEL: NO PAIN (0)

## 2023-03-24 NOTE — PROGRESS NOTES
NEUROLOGY PROGRESS NOTE   [facility]    Patient:Michele Lezama  : 1970  Age: 52 year old  Today's virtual Visit: 2023    History of present illness:     Michele is participating in this virtual visit for a follow up on his epilepsy and headaches. He did not have any seizures since last visit March last year.  Taking carbamazepine 400 mg 4 times a day and primidone 200 mg 4 times a day.    He didn't have any headaches in Dec, had 2 headaches in , 5 in Feb and 2 in March. His headaches get up to 7-8 in severity. He gets vertigo with his headaches, which is new.  Sumatriptan makes him sleepy, so he doesn't take it.  He uses Excedrin migraine, which doesn't help.      He had a fall in February because of tripping on items on the floor and fell face down.  He did not lose consciousness.  He has been getting dizzy with his headaches.  ER notes mention, he had neck pain headache with dizziness, right wrist and right ankle and right shoulder pain and some abrasion but no laceration.  His head CT and cervical spine were negative for fracture or intracranial hemorrhage.    He found out the more he reads and the more he needs to use his eyes it causes strain on his eyes, his headaches get worse. He was found to have cataracts. He is going to get cataract surgery.      AST levels 2022  Carbamazepine 9.9  Primidone  10.2      Current Outpatient Medications   Medication Sig Dispense Refill     carBAMazepine (TEGRETOL) 200 MG tablet TAKE 2 TABLETS (400 MG) BY MOUTH 4 TIMES DAILY 720 tablet 3     galcanezumab-gnlm (EMGALITY) 120 MG/ML injection Inject 1 mL (120 mg) Subcutaneous every 28 days 1 mL 11     ibuprofen (ADVIL,MOTRIN) 800 MG tablet TAKE 1 TABLET (800 MG) BY MOUTH 3 TIMES DAILY AS NEEDED 90 tablet 3     lisinopril-hydrochlorothiazide (PRINZIDE/ZESTORETIC) 10-12.5 MG tablet Take 1 tablet by mouth daily       multivitamin w/minerals (THERA-VIT-M) tablet Take 1 tablet by mouth daily       primidone  (MYSOLINE) 250 MG tablet TAKE 1 TABLET (250 MG) BY MOUTH 4 TIMES DAILY 360 tablet 3     acetaminophen (TYLENOL) 325 MG tablet Take 2 tablets (650 mg) by mouth every 4 hours as needed for other (For optimal non-opioid multimodal pain management to improve pain control.) 30 tablet 0     glucosamine-chondroitin 500-400 MG CAPS per capsule Take 2 capsules by mouth daily (Patient not taking: Reported on 3/24/2023)       hydrOXYzine (ATARAX) 25 MG tablet Take 2 tablets (50 mg) by mouth every 6 hours as needed for itching 90 tablet 0     SUMAtriptan (IMITREX) 50 MG tablet Take 1 tablet (50 mg) by mouth at onset of headache for migraine May repeat in 2 hours. Max 2 tablets/24 hours. 18 tablet 5     Exam:    /80   Pulse 74   Wt 142.9 kg (315 lb)   BMI 46.52 kg/m       Wt Readings from Last 5 Encounters:   03/24/23 142.9 kg (315 lb)   03/14/23 (!) 150.8 kg (332 lb 7.3 oz)   02/04/23 147.4 kg (325 lb)   07/26/21 142.4 kg (313 lb 14.4 oz)   06/04/21 144.7 kg (319 lb)     Neurological examination was performed over video link. Alert and fully oriented. Language was fluent with normal content.  Extraocular movements intact without nystagmus. Smile symmetrial.  There is no drift, pronation, or tremor.    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 Seizures have been controlled for almost 9 years. He had 2ry generalized tonic-clonic seizures in childhood and later had focal impaired-aware seizures. He is taking primidone 250 mg 4 times a day and carbamazepine 400 mg 4 times a day. Levels are therapeutic. Denies side effects.     #2 migraine headaches: He had increased headaches in February.  He also notes that he has been getting vertigo recently.  These might be due to his fall and hitting his head in a early February.  He is getting Emgality every 28 days.  Sumatriptan does not help.  I discussed switching to Maxalt.      - Continue ASDs as before.    -Continue galcanezumab 120 mg subcu every 28  days    -Take rizatriptan 10 mg at onset of headache.  May repeat in 2 hours.  Do not exceed 2 tabs in 24 hours.    - Follow up in 6 months.          As described above, I met with the patient for 27 minutes and during this time counseling was greater than 50% of the visit time.  I spent an additional 10 minutes on preparation for the visit and documentation. This note was created in part by the use of Dragon voice recognition system. Inadvertent grammatical errors and typographical errors may still exist.    Nerissa Wooten MD

## 2023-03-24 NOTE — LETTER
3/24/2023         RE: Michele Lezama  [address] [facility] [address]        Dear Colleague,    Thank you for referring your patient, Michele Lezama, to the [facility] [place]. Please see a copy of my visit note below.     NEUROLOGY PROGRESS NOTE   [facility]    Patient:Michele Lezama  : 1970  Age: 52 year old  Today's virtual Visit: 2023    History of present illness:     Michele is participating in this virtual visit for a follow up on his epilepsy and headaches. He did not have any seizures since last visit March last year.  Taking carbamazepine 400 mg 4 times a day and primidone 200 mg 4 times a day.    He didn't have any headaches in Dec, had 2 headaches in , 5 in Feb and 2 in March. His headaches get up to 7-8 in severity. He gets vertigo with his headaches, which is new.  Sumatriptan makes him sleepy, so he doesn't take it.  He uses Excedrin migraine, which doesn't help.      He had a fall in February because of tripping on items on the floor and fell face down.  He did not lose consciousness.  He has been getting dizzy with his headaches.  ER notes mention, he had neck pain headache with dizziness, right wrist and right ankle and right shoulder pain and some abrasion but no laceration.  His head CT and cervical spine were negative for fracture or intracranial hemorrhage.    He found out the more he reads and the more he needs to use his eyes it causes strain on his eyes, his headaches get worse. He was found to have cataracts. He is going to get cataract surgery.      AST levels 2022  Carbamazepine 9.9  Primidone  10.2      Current Outpatient Medications   Medication Sig Dispense Refill     carBAMazepine (TEGRETOL) 200 MG tablet TAKE 2 TABLETS (400 MG) BY MOUTH 4 TIMES DAILY 720 tablet 3     galcanezumab-gnlm (EMGALITY) 120 MG/ML injection Inject 1 mL (120 mg) Subcutaneous every 28 days 1 mL 11     ibuprofen (ADVIL,MOTRIN) 800 MG tablet TAKE 1  TABLET (800 MG) BY MOUTH 3 TIMES DAILY AS NEEDED 90 tablet 3     lisinopril-hydrochlorothiazide (PRINZIDE/ZESTORETIC) 10-12.5 MG tablet Take 1 tablet by mouth daily       multivitamin w/minerals (THERA-VIT-M) tablet Take 1 tablet by mouth daily       primidone (MYSOLINE) 250 MG tablet TAKE 1 TABLET (250 MG) BY MOUTH 4 TIMES DAILY 360 tablet 3     acetaminophen (TYLENOL) 325 MG tablet Take 2 tablets (650 mg) by mouth every 4 hours as needed for other (For optimal non-opioid multimodal pain management to improve pain control.) 30 tablet 0     glucosamine-chondroitin 500-400 MG CAPS per capsule Take 2 capsules by mouth daily (Patient not taking: Reported on 3/24/2023)       hydrOXYzine (ATARAX) 25 MG tablet Take 2 tablets (50 mg) by mouth every 6 hours as needed for itching 90 tablet 0     SUMAtriptan (IMITREX) 50 MG tablet Take 1 tablet (50 mg) by mouth at onset of headache for migraine May repeat in 2 hours. Max 2 tablets/24 hours. 18 tablet 5     Exam:    /80   Pulse 74   Wt 142.9 kg (315 lb)   BMI 46.52 kg/m       Wt Readings from Last 5 Encounters:   03/24/23 142.9 kg (315 lb)   03/14/23 (!) 150.8 kg (332 lb 7.3 oz)   02/04/23 147.4 kg (325 lb)   07/26/21 142.4 kg (313 lb 14.4 oz)   06/04/21 144.7 kg (319 lb)     Neurological examination was performed over video link. Alert and fully oriented. Language was fluent with normal content.  Extraocular movements intact without nystagmus. Smile symmetrial.  There is no drift, pronation, or tremor.    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 Seizures have been controlled for almost 9 years. He had 2ry generalized tonic-clonic seizures in childhood and later had focal impaired-aware seizures. He is taking primidone 250 mg 4 times a day and carbamazepine 400 mg 4 times a day. Levels are therapeutic. Denies side effects.     #2 migraine headaches: He had increased headaches in February.  He also notes that he has been getting vertigo recently.   These might be due to his fall and hitting his head in a early February.  He is getting Emgality every 28 days.  Sumatriptan does not help.  I discussed switching to Maxalt.      - Continue ASDs as before.    -Continue galcanezumab 120 mg subcu every 28 days    -Take rizatriptan 10 mg at onset of headache.  May repeat in 2 hours.  Do not exceed 2 tabs in 24 hours.    - Follow up in 6 months.          As described above, I met with the patient for 27 minutes and during this time counseling was greater than 50% of the visit time.  I spent an additional 10 minutes on preparation for the visit and documentation. This note was created in part by the use of Dragon voice recognition system. Inadvertent grammatical errors and typographical errors may still exist.    Nerissa Wooten MD

## 2023-03-24 NOTE — LETTER
3/24/2023       RE: Michele Lezama  : 1970   MRN: [id number]        To whom it may concern,    Mr. Michele Lezama is under my supervision for his migraine headaches. He had a severe headache on 2023 which prevented him to go to work. Please let me know if you have any questions or concerns.       Best Regards,    Nerissa Wooten MD

## 2023-03-27 DIAGNOSIS — G40.209 PARTIAL SYMPTOMATIC EPILEPSY WITH COMPLEX PARTIAL SEIZURES, NOT INTRACTABLE, WITHOUT STATUS EPILEPTICUS (H): ICD-10-CM

## 2023-03-27 NOTE — CONFIDENTIAL NOTE
Prior Authorization Retail Medication Request    Medication/Dose: galcanezumab-gnlm (EMGALITY) 120 MG/ML injection  ICD code (if different than what is on RX):    Previously Tried and Failed:  Currently taking this medication  Rationale:  Continuation of care on this medication     Insurance Name:  [employer]  Insurance ID:  [id number]      Pharmacy Information (if different than what is on RX)  Name:  [facility]  Phone:  [phone]    The medication is not listed on the refill protocol. Encounter routed to the provider to review and sign.    Jael King RN Care Coordinator   Neurology/Neurosurgery/PM&R/ Pain Management

## 2023-04-29 ENCOUNTER — HEALTH MAINTENANCE LETTER (OUTPATIENT)
Age: 53
End: 2023-04-29

## 2023-08-17 ENCOUNTER — HOSPITAL ENCOUNTER (EMERGENCY)
Facility: CLINIC | Age: 53
Discharge: HOME | End: 2023-08-17
Attending: EMERGENCY MEDICINE | Admitting: EMERGENCY MEDICINE
Payer: COMMERCIAL

## 2023-08-17 VITALS
DIASTOLIC BLOOD PRESSURE: 85 MMHG | TEMPERATURE: 98.3 F | HEIGHT: 69 IN | BODY MASS INDEX: 46.65 KG/M2 | OXYGEN SATURATION: 100 % | HEART RATE: 70 BPM | WEIGHT: 315 LBS | SYSTOLIC BLOOD PRESSURE: 159 MMHG | RESPIRATION RATE: 18 BRPM

## 2023-08-17 DIAGNOSIS — J06.9 UPPER RESPIRATORY TRACT INFECTION, UNSPECIFIED TYPE: ICD-10-CM

## 2023-08-17 LAB — SARS-COV-2 RNA RESP QL NAA+PROBE: NEGATIVE

## 2023-08-17 PROCEDURE — 87635 SARS-COV-2 COVID-19 AMP PRB: CPT | Performed by: STUDENT IN AN ORGANIZED HEALTH CARE EDUCATION/TRAINING PROGRAM

## 2023-08-17 PROCEDURE — 99283 EMERGENCY DEPT VISIT LOW MDM: CPT

## 2023-08-17 RX ORDER — ALBUTEROL SULFATE 90 UG/1
2 AEROSOL, METERED RESPIRATORY (INHALATION) EVERY 6 HOURS PRN
Qty: 18 G | Refills: 0 | Status: SHIPPED | OUTPATIENT
Start: 2023-08-17

## 2023-08-17 ASSESSMENT — ACTIVITIES OF DAILY LIVING (ADL): ADLS_ACUITY_SCORE: 33

## 2023-08-17 NOTE — Clinical Note
Michele Lezama was seen and treated in our emergency department on 8/17/2023.  He may return to work on 08/18/2023.       If you have any questions or concerns, please don't hesitate to call.      Mehreen Bashir PA-C

## 2023-08-17 NOTE — ED PROVIDER NOTES
"  History     Chief Complaint:  Flu Symptoms       HPI   Michele Lezama is a 52 year old male with history of HTN, epilepsy, migraines, who presents with flu-like symptoms. Patient states that he believes he has a sinus infection. For the past couple of days, he has been experiencing a post-nasal drop with cough. Also reports feeling congested. Denies any sinus pain or pressure, fevers, chills, chest pain. States that he had very similar symptoms the last time he had COVID. Denies sick contacts. Took sudafed, mucinex, and dayquil earlier today. Has not been vaccinated for COVID. He is uncertain when symptoms started as he has \"sensitive sinuses\" and has nasal drip frequently.       Independent Historian:   None - Patient Only    Review of External Notes:   Reviewed [facility] - has not received any COVID vaccines  ED visit from 9/1/22 - COVID infection, not requiring admission      Medications:    albuterol (PROAIR HFA/PROVENTIL HFA/VENTOLIN HFA) 108 (90 Base) MCG/ACT inhaler  acetaminophen (TYLENOL) 325 MG tablet  carBAMazepine (TEGRETOL) 200 MG tablet  galcanezumab-gnlm (EMGALITY) 120 MG/ML injection  glucosamine-chondroitin 500-400 MG CAPS per capsule  ibuprofen (ADVIL,MOTRIN) 800 MG tablet  lisinopril-hydrochlorothiazide (PRINZIDE/ZESTORETIC) 10-12.5 MG tablet  multivitamin w/minerals (THERA-VIT-M) tablet  primidone (MYSOLINE) 250 MG tablet  rizatriptan (MAXALT) 10 MG tablet        Past Medical History:    Past Medical History:   Diagnosis Date    Arthritis     GERD (gastroesophageal reflux disease)     GERD (gastroesophageal reflux disease)     Hypertension     Hypertension     Migraine     Migraine     Morbid obesity (H)     Obese     Osteoarthritis     Seizure, petit mal (H) 2008    Seizures (H)     Sleep apnea     Sleep apnea        Past Surgical History:    Past Surgical History:   Procedure Laterality Date    ARTHROPLASTY KNEE Left 7/26/2021    Procedure: LEFT TOTAL KNEE ARTHROPLASTY;  Surgeon: Karri Ceja, " "MD;  Location: [facility] OR    ARTHROSCOPY KNEE      EXAM UNDER ANESTHESIA, MANIPULATE JOINT (LOCATION) Right 7/26/2021    Procedure: RIGHT KNEE MANIPULATION;  Surgeon: Karri Ceja MD;  Location: [facility] OR    EYE SURGERY      HC KNEE SCOPE,SINGLE MENISECTOMY Left 12/13/2016    Procedure: KNEE ARTHROSCOPY WITH MENISECTOMY, LEFT, PARTIAL MEDIAL MENISECTOMY ;  Surgeon: Karri Ceja MD;  Location: [facility] OR;  Service: Orthopedics    OTHER SURGICAL HISTORY      carpel tunnel    TONSILLECTOMY      ZZC TOTAL KNEE ARTHROPLASTY Right 11/4/2020    Procedure: RIGHT TOTAL KNEE ARTHROPLASTY;  Surgeon: Karri Ceja MD;  Location: [facility] OR;  Service: Orthopedics        Physical Exam   Patient Vitals for the past 24 hrs:   BP Temp Pulse Resp SpO2 Height Weight   08/17/23 0623 (!) 159/85 -- -- -- -- -- --   08/17/23 0621 -- 98.3  F (36.8  C) 70 18 100 % 1.753 m (5' 9\") 147.4 kg (325 lb)        Physical Exam  General: Alert and cooperative with exam. Patient in no apparent distress. Normal mentation. Sounds congested  Head:  Scalp is NC/AT. No frontal or maxillary sinus pressure with palpation  Eyes:  No scleral icterus, PERRL  ENT:  The external nose and ears are normal. The oropharynx is normal and without erythema; mucus membranes are moist. Uvula midline, no evidence of deep space infection.  Neck:  Normal range of motion without rigidity.  CV:  Regular rate and rhythm    No pathologic murmur   Resp:  Breath sounds are clear bilaterally    Non-labored, no retractions or accessory muscle use  GI:  Abdomen is soft, no distension, no tenderness. No peritoneal signs  MS:  No lower extremity edema   Skin:  Warm and dry, No rash or lesions noted.  Neuro:  Oriented x 3. No gross motor deficits.      Emergency Department Course     Laboratory:  Labs Ordered and Resulted from Time of ED Arrival to Time of ED Departure - No data to display     Procedures   None    Emergency Department Course & " Assessments:    Interventions:  Medications - No data to display     Independent Interpretation (X-rays, CTs, rhythm strip):  None    Consultations/Discussion of Management or Tests:  None        Social Determinants of Health affecting care:   None    Disposition:  The patient was discharged to home.     Impression & Plan      Medical Decision Making:  Michele Lezama is a 52 year old male who presents with flu-like symptoms. Uncertain how long symptoms have been present as he has nasal drip chronically. No history of COVID vaccines. Here, patient is afebrile and vitally stable. Without sinus pressure or pain to palpation, low suspicion for sinus infection. Patient has been experiencing intermittent cough, without sore throat, and oropharynx without significant erythema or exudates present, low suspicion for strep throat. COVID testing completed and pending at time of discharge. Patient will be called with results. Instructed him to quarantine for 5 days if testing comes back positive. Patient is on carbamazepine, not a candidate for Paxlovid. Recommend he follow up with PCP within 1-2 weeks for reassessment. Continue tylenol and ibuprofen as needed for fever reduction and symptomatic relief. Reasons to return to ER discussed.      Diagnosis:    ICD-10-CM    1. Upper respiratory tract infection, unspecified type  J06.9            Discharge Medications:  Discharge Medication List as of 8/17/2023  7:15 AM        START taking these medications    Details   albuterol (PROAIR HFA/PROVENTIL HFA/VENTOLIN HFA) 108 (90 Base) MCG/ACT inhaler Inhale 2 puffs into the lungs every 6 hours as needed for shortness of breath, wheezing or cough, Disp-18 g, R-0, E-PrescribePharmacy may dispense brand covered by insurance (Proair, or proventil or ventolin or generic albuterol inhaler)                8/17/2023   SHANTI Barrow Lauren R, PA-C  08/17/23 0743

## 2023-08-17 NOTE — ED PROVIDER NOTES
"ED ATTENDING PHYSICIAN NOTE:   I evaluated this patient in conjunction with Mehreen Bashir PA-C  I have participated in the care of the patient and personally performed key elements of the history, exam, and medical decision making.      HPI:   Michele Lezama is a 52 year old male who presents to the ED with cough and congestion.  At times he has slight productive cough.  He says he has chronic \"postnasal drip\" and has been evaluated through the outpatient clinic for this.  He has been using Mucinex and DayQuil without relief of his symptoms.  His symptoms have been present for about 24 hours.  No known ill contacts.  He states his symptoms feel similar to COVID in the past.  He is unvaccinated but did not have any complications recovering.    Review of External Notes:   Family practice history and physical reviewed from April 17, 2023.  The patient at that time was noted to have seizures well controlled with primidone, carbamazepine, phenobarbital.     EXAM:   General: No distress  HEENT: Oropharynx widely patent, no erythema or exudate  Lungs: Clear to auscultation bilaterally, no wheezes  Cardiac: Regular rate and rhythm, no murmur  Abdomen: Soft, nontender, nondistended     MEDICAL DECISION MAKING/ASSESSMENT AND PLAN:   This patient presents with URI symptoms.  He is appropriate for outpatient follow-up.  No hypoxia or tachycardia.  No chest pain.  He was prescribed an albuterol inhaler and will take supportive cares.     DIAGNOSIS:     ICD-10-CM    1. Upper respiratory tract infection, unspecified type  J06.9                DISPOSITION:   Home     Solomon Roberts MD  8/17/2023  [facility] EMERGENCY DEPT      Solomon Roberts MD  08/17/23 0659    "

## 2023-08-17 NOTE — ED TRIAGE NOTES
Pt here today for evaluation of covid symptoms that started 2 days ago. Pt has productive cough, headaches, SOB. Denies fevers N/V/D. Pt took sudafed at 0300.

## 2023-08-17 NOTE — DISCHARGE INSTRUCTIONS
You will receive a call later today with the results of your COVID testing.  If the results are positive, you should quarantine for 5 days.  I have provided a work note to excuse you today.  If you require further work excuse to quarantine, please follow-up with primary provider for this via MyChart if possible.    If symptoms worsen including chest pain, increased shortness of breath, fever that cannot be controlled with Tylenol or ibuprofen at home, please return to ER.

## 2023-10-06 ENCOUNTER — OFFICE VISIT (OUTPATIENT)
Dept: NEUROLOGY | Facility: CLINIC | Age: 53
End: 2023-10-06
Payer: COMMERCIAL

## 2023-10-06 VITALS
HEART RATE: 55 BPM | BODY MASS INDEX: 46.65 KG/M2 | WEIGHT: 315 LBS | RESPIRATION RATE: 18 BRPM | SYSTOLIC BLOOD PRESSURE: 113 MMHG | DIASTOLIC BLOOD PRESSURE: 77 MMHG | OXYGEN SATURATION: 98 % | HEIGHT: 69 IN

## 2023-10-06 DIAGNOSIS — G40.109 FOCAL EPILEPSY (H): Primary | ICD-10-CM

## 2023-10-06 DIAGNOSIS — G43.009 MIGRAINE WITHOUT AURA AND WITHOUT STATUS MIGRAINOSUS, NOT INTRACTABLE: ICD-10-CM

## 2023-10-06 PROCEDURE — 99213 OFFICE O/P EST LOW 20 MIN: CPT | Performed by: PSYCHIATRY & NEUROLOGY

## 2023-10-06 NOTE — NURSING NOTE
Chief Complaint   Patient presents with    Follow Up     Follow up for migraines and epilepsy        There were no vitals filed for this visit.    There is no height or weight on file to calculate BMI.      JACOBO Tong NREMT

## 2023-10-06 NOTE — PROGRESS NOTES
NEUROLOGY PROGRESS NOTE   [facility]    Patient:Michele Lezama  : 1970  Age: 52 year old  Today's Office Visit: 2023    History of present illness:     Mr. Michele Lezama presents to the clinic for follow-up on his epilepsy and migraine headaches.  He was last seen 2023.      He did not have any seizures since last visit.  He is taking carbamazepine 400 mg 4 times a day and primidone 250 mg 4 times a day.   His last seizure was 2008 due to missing medication.     He is migraine headaches are overall better.  They were mild till last Wednesday, when he had a severe one which caused dizziness and nausea. He took Excedrin migraine and improved after 4, 1/2 hrs.  He hasn't been taking Maxalt. He is afraid to get sleepy or tired.  He is on Emgality injection.     ASD levels 23  CBZ 8.4  Phenobarbital 27.6     He uses a pill box.    Current Outpatient Medications   Medication Sig Dispense Refill     albuterol (PROAIR HFA/PROVENTIL HFA/VENTOLIN HFA) 108 (90 Base) MCG/ACT inhaler Inhale 2 puffs into the lungs every 6 hours as needed for shortness of breath, wheezing or cough 18 g 0     carBAMazepine (TEGRETOL) 200 MG tablet TAKE 2 TABLETS (400 MG) BY MOUTH 4 TIMES DAILY 720 tablet 3     galcanezumab-gnlm (EMGALITY) 120 MG/ML injection Inject 1 mL (120 mg) Subcutaneous every 28 days 1 mL 11     ibuprofen (ADVIL,MOTRIN) 800 MG tablet TAKE 1 TABLET (800 MG) BY MOUTH 3 TIMES DAILY AS NEEDED 90 tablet 3     lisinopril-hydrochlorothiazide (PRINZIDE/ZESTORETIC) 10-12.5 MG tablet Take 1 tablet by mouth daily       multivitamin w/minerals (THERA-VIT-M) tablet Take 1 tablet by mouth daily       primidone (MYSOLINE) 250 MG tablet TAKE 1 TABLET (250 MG) BY MOUTH 4 TIMES DAILY 360 tablet 3     acetaminophen (TYLENOL) 325 MG tablet Take 2 tablets (650 mg) by mouth every 4 hours as needed for other (For optimal non-opioid multimodal pain management to improve pain control.) 30 tablet 0      "glucosamine-chondroitin 500-400 MG CAPS per capsule Take 2 capsules by mouth daily (Patient not taking: Reported on 3/24/2023)       rizatriptan (MAXALT) 10 MG tablet Take 1 tablet (10 mg) by mouth at onset of headache for migraine May repeat in 2 hours. Max 2 tablets/24 hours. (Patient not taking: Reported on 10/6/2023) 12 tablet 5       Exam:    /77 (BP Location: Right arm, Patient Position: Sitting, Cuff Size: Adult Large)   Pulse 55   Resp 18   Ht 5' 9\" (175.3 cm)   Wt 319 lb (144.7 kg)   SpO2 98%   BMI 47.11 kg/m       Wt Readings from Last 5 Encounters:   10/06/23 319 lb (144.7 kg)   08/17/23 325 lb (147.4 kg)   03/24/23 315 lb (142.9 kg)   03/14/23 (!) 332 lb 7.3 oz (150.8 kg)   02/04/23 325 lb (147.4 kg)     General Appearance: Alert, awake, cooperative, pleasant, NAD  Gait: steady  Attention Span:  Normal  Language/speech: no aphasia or dysarthria  Extraocular Movements:  Normal  Coordination:  Normal finger to nose  Facial Strength:  Normal  Motor Exam: normal tone, bulk and strength 5/5 bilaterally    Assessment/Plan:    #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 Seizures have been controlled for almost 15 years. He had 2ry generalized tonic-clonic seizures in childhood and later had focal impaired-aware seizures. He is taking primidone 250 mg 4 times a day and carbamazepine 400 mg 4 times a day. Levels are therapeutic. Denies side effects.      #2 migraine headaches:  Headaches improved much on Emgality.          - Continue ASDs as before.     - Continue galcanezumab 120 mg subcu every 28 days     -follow up in 6 months        As described above, I met with the patient for 25 minutes and during this time counseling was greater than 50% of the visit time.  Nerissa Wooten MD    "

## 2023-10-06 NOTE — LETTER
10/6/2023         RE: Michele Lezama  [id number] [facility] [address]        Dear Colleague,    Thank you for referring your patient, Michele Lezama, to the [facility] [place]. Please see a copy of my visit note below.     NEUROLOGY PROGRESS NOTE   [facility]    Patient:Michele Lezama  : 1970  Age: 52 year old  Today's Office Visit: 2023    History of present illness:     Mr. Michele Lezama presents to the clinic for follow-up on his epilepsy and migraine headaches.  He was last seen 2023.      He did not have any seizures since last visit.  He is taking carbamazepine 400 mg 4 times a day and primidone 250 mg 4 times a day.   His last seizure was 2008 due to missing medication.     He is migraine headaches are overall better.  They were mild till last Wednesday, when he had a severe one which caused dizziness and nausea. He took Excedrin migraine and improved after 4, 1/2 hrs.  He hasn't been taking Maxalt. He is afraid to get sleepy or tired.  He is on Emgality injection.     ASD levels 23  CBZ 8.4  Phenobarbital 27.6     He uses a pill box.    Current Outpatient Medications   Medication Sig Dispense Refill    albuterol (PROAIR HFA/PROVENTIL HFA/VENTOLIN HFA) 108 (90 Base) MCG/ACT inhaler Inhale 2 puffs into the lungs every 6 hours as needed for shortness of breath, wheezing or cough 18 g 0    carBAMazepine (TEGRETOL) 200 MG tablet TAKE 2 TABLETS (400 MG) BY MOUTH 4 TIMES DAILY 720 tablet 3    galcanezumab-gnlm (EMGALITY) 120 MG/ML injection Inject 1 mL (120 mg) Subcutaneous every 28 days 1 mL 11    ibuprofen (ADVIL,MOTRIN) 800 MG tablet TAKE 1 TABLET (800 MG) BY MOUTH 3 TIMES DAILY AS NEEDED 90 tablet 3    lisinopril-hydrochlorothiazide (PRINZIDE/ZESTORETIC) 10-12.5 MG tablet Take 1 tablet by mouth daily      multivitamin w/minerals (THERA-VIT-M) tablet Take 1 tablet by mouth daily      primidone (MYSOLINE) 250 MG tablet TAKE 1 TABLET (250 MG) BY  "MOUTH 4 TIMES DAILY 360 tablet 3    acetaminophen (TYLENOL) 325 MG tablet Take 2 tablets (650 mg) by mouth every 4 hours as needed for other (For optimal non-opioid multimodal pain management to improve pain control.) 30 tablet 0    glucosamine-chondroitin 500-400 MG CAPS per capsule Take 2 capsules by mouth daily (Patient not taking: Reported on 3/24/2023)      rizatriptan (MAXALT) 10 MG tablet Take 1 tablet (10 mg) by mouth at onset of headache for migraine May repeat in 2 hours. Max 2 tablets/24 hours. (Patient not taking: Reported on 10/6/2023) 12 tablet 5       Exam:    /77 (BP Location: Right arm, Patient Position: Sitting, Cuff Size: Adult Large)   Pulse 55   Resp 18   Ht 5' 9\" (175.3 cm)   Wt 319 lb (144.7 kg)   SpO2 98%   BMI 47.11 kg/m       Wt Readings from Last 5 Encounters:   10/06/23 319 lb (144.7 kg)   08/17/23 325 lb (147.4 kg)   03/24/23 315 lb (142.9 kg)   03/14/23 (!) 332 lb 7.3 oz (150.8 kg)   02/04/23 325 lb (147.4 kg)     General Appearance: Alert, awake, cooperative, pleasant, NAD  Gait: steady  Attention Span:  Normal  Language/speech: no aphasia or dysarthria  Extraocular Movements:  Normal  Coordination:  Normal finger to nose  Facial Strength:  Normal  Motor Exam: normal tone, bulk and strength 5/5 bilaterally    Assessment/Plan:    #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 Seizures have been controlled for almost 15 years. He had 2ry generalized tonic-clonic seizures in childhood and later had focal impaired-aware seizures. He is taking primidone 250 mg 4 times a day and carbamazepine 400 mg 4 times a day. Levels are therapeutic. Denies side effects.      #2 migraine headaches:  Headaches improved much on Emgality.          - Continue ASDs as before.     - Continue galcanezumab 120 mg subcu every 28 days     -follow up in 6 months        As described above, I met with the patient for 25 minutes and during this time counseling was greater than 50% of the visit " time.  Nerissa Wooten MD

## 2024-03-08 ENCOUNTER — OFFICE VISIT (OUTPATIENT)
Dept: NEUROLOGY | Facility: CLINIC | Age: 54
End: 2024-03-08
Payer: COMMERCIAL

## 2024-03-08 DIAGNOSIS — G43.719 INTRACTABLE CHRONIC MIGRAINE WITHOUT AURA AND WITHOUT STATUS MIGRAINOSUS: ICD-10-CM

## 2024-03-08 DIAGNOSIS — G40.209 PARTIAL SYMPTOMATIC EPILEPSY WITH COMPLEX PARTIAL SEIZURES, NOT INTRACTABLE, WITHOUT STATUS EPILEPTICUS (H): ICD-10-CM

## 2024-03-08 PROCEDURE — G2211 COMPLEX E/M VISIT ADD ON: HCPCS | Performed by: PSYCHIATRY & NEUROLOGY

## 2024-03-08 PROCEDURE — 99214 OFFICE O/P EST MOD 30 MIN: CPT | Performed by: PSYCHIATRY & NEUROLOGY

## 2024-03-08 RX ORDER — GALCANEZUMAB 120 MG/ML
120 INJECTION, SOLUTION SUBCUTANEOUS
Qty: 3 ML | Refills: 3 | Status: SHIPPED | OUTPATIENT
Start: 2024-03-08 | End: 2024-06-20

## 2024-03-08 RX ORDER — CARBAMAZEPINE 200 MG/1
TABLET ORAL
Qty: 720 TABLET | Refills: 3 | Status: SHIPPED | OUTPATIENT
Start: 2024-03-08

## 2024-03-08 RX ORDER — GALCANEZUMAB 120 MG/ML
120 INJECTION, SOLUTION SUBCUTANEOUS
Qty: 3 ML | Refills: 11 | Status: SHIPPED | OUTPATIENT
Start: 2024-03-08 | End: 2024-03-08

## 2024-03-08 RX ORDER — RIZATRIPTAN BENZOATE 10 MG/1
10 TABLET ORAL
Qty: 12 TABLET | Refills: 5 | Status: SHIPPED | OUTPATIENT
Start: 2024-03-08

## 2024-03-08 RX ORDER — PRIMIDONE 250 MG/1
TABLET ORAL
Qty: 360 TABLET | Refills: 3 | Status: SHIPPED | OUTPATIENT
Start: 2024-03-08

## 2024-03-08 ASSESSMENT — PAIN SCALES - GENERAL: PAINLEVEL: EXTREME PAIN (8)

## 2024-03-08 NOTE — NURSING NOTE
Chief Complaint   Patient presents with    RECHECK     Per patient dizziness has increased for the last 3-4 months with migraines  Patient feels that he is experiencing cluster headaches as well     Jeni Man CMA at 11:28 AM on 3/8/2024.

## 2024-03-08 NOTE — LETTER
3/8/2024         RE: Michele Lezama  [id number] [facility] [address]        Dear Colleague,    Thank you for referring your patient, Michele Lezama, to the [facility] [place]. Please see a copy of my visit note below.     NEUROLOGY PROGRESS NOTE   [facility]    Patient:Michele Lezama  : 1970  Age: 53 year old  Today's Office Visit: 2024    History of present illness:     Mr. Michele Lezama presents to the clinic for follow-up on his epilepsy and migraine headaches.  He was last seen 2023.       He did not have any seizures since last visit.  He is taking carbamazepine 400 mg 4 times a day and primidone 250 mg 4 times a day.   His last seizure was 2008 due to missing medication.     His headaches worsened lately.  The pain is behind his right eye currently.  It gets better when he presses on it or takes ibuprofen.  With his migraine headaches, the pain is typically on the right side of his head, but occasionally on the left.  it feels pounding or sharp, it's typically not more than 8. When severity is 8, he needs to lay down.  He takes an Excedrin and ibuprofen to improve.  They last about an hour.  It woke him up twice.   He went 2 months without a headache, but this Monday started having a headache, which improved some, but it has been on and off, he had to leave work yesterday.    He is on Emgality injections.  He is taking Maxalt as needed. On average he has 4-5 headaches per month which is better compare to when he was off Emgality.  Sometimes he has an aura    Carbamazepine level 3/1/2024:  8.2  Phenobarbital level 20.4    Current Outpatient Medications   Medication Sig Dispense Refill    albuterol (PROAIR HFA/PROVENTIL HFA/VENTOLIN HFA) 108 (90 Base) MCG/ACT inhaler Inhale 2 puffs into the lungs every 6 hours as needed for shortness of breath, wheezing or cough 18 g 0    carBAMazepine (TEGRETOL) 200 MG tablet TAKE 2 TABLETS (400 MG) BY MOUTH 4 TIMES  DAILY 720 tablet 3    galcanezumab-gnlm (EMGALITY) 120 MG/ML injection Inject 1 mL (120 mg) Subcutaneous every 28 (twenty-eight) days 3 mL 3    ibuprofen (ADVIL,MOTRIN) 800 MG tablet TAKE 1 TABLET (800 MG) BY MOUTH 3 TIMES DAILY AS NEEDED 90 tablet 3    lisinopril-hydrochlorothiazide (PRINZIDE/ZESTORETIC) 10-12.5 MG tablet Take 1 tablet by mouth daily      multivitamin w/minerals (THERA-VIT-M) tablet Take 1 tablet by mouth daily      primidone (MYSOLINE) 250 MG tablet TAKE 1 TABLET (250 MG) BY MOUTH 4 TIMES DAILY 360 tablet 3    acetaminophen (TYLENOL) 325 MG tablet Take 2 tablets (650 mg) by mouth every 4 hours as needed for other (For optimal non-opioid multimodal pain management to improve pain control.) 30 tablet 0    glucosamine-chondroitin 500-400 MG CAPS per capsule Take 2 capsules by mouth daily (Patient not taking: Reported on 3/24/2023)      rizatriptan (MAXALT) 10 MG tablet Take 1 tablet (10 mg) by mouth at onset of headache for migraine May repeat in 2 hours. Max 2 tablets/24 hours. 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 Seizures have been controlled for almost 15 years. He had 2ry generalized tonic-clonic seizures in childhood and later had focal impaired-aware seizures. He is taking primidone 250 mg 4 times a day and carbamazepine 400 mg 4 times a day. Levels are therapeutic. Denies side effects.      #2 migraine headaches:  His  headaches worsened in the past week. Overall they got better on Emgality.  I advised him not to take too much OTC pain medications since it can cause rebound headache. I advised him to take Maxalt as soon as he has an aura, and if had no aura, as soon as headache starts.          - Continue ASDs as before.     - Continue galcanezumab 120 mg subcu every 28 days     -follow up in 1 year      As described above, I met with the patient for 25 minutes and during this time counseling was greater than 50% of the visit time. I spent an additional 10 min on chart review and documentation.   Nerissa Wooten MD

## 2024-03-08 NOTE — PROGRESS NOTES
NEUROLOGY PROGRESS NOTE   [facility]    Patient:Michele Lezama  : 1970  Age: 53 year old  Today's Office Visit: 2024    History of present illness:     Mr. Michele Lezama presents to the clinic for follow-up on his epilepsy and migraine headaches.  He was last seen 2023.       He did not have any seizures since last visit.  He is taking carbamazepine 400 mg 4 times a day and primidone 250 mg 4 times a day.   His last seizure was 2008 due to missing medication.     His headaches worsened lately.  The pain is behind his right eye currently.  It gets better when he presses on it or takes ibuprofen.  With his migraine headaches, the pain is typically on the right side of his head, but occasionally on the left.  it feels pounding or sharp, it's typically not more than 8. When severity is 8, he needs to lay down.  He takes an Excedrin and ibuprofen to improve.  They last about an hour.  It woke him up twice.   He went 2 months without a headache, but this Monday started having a headache, which improved some, but it has been on and off, he had to leave work yesterday.    He is on Emgality injections.  He is taking Maxalt as needed. On average he has 4-5 headaches per month which is better compare to when he was off Emgality.  Sometimes he has an aura    Carbamazepine level 3/1/2024:  8.2  Phenobarbital level 20.4    Current Outpatient Medications   Medication Sig Dispense Refill    albuterol (PROAIR HFA/PROVENTIL HFA/VENTOLIN HFA) 108 (90 Base) MCG/ACT inhaler Inhale 2 puffs into the lungs every 6 hours as needed for shortness of breath, wheezing or cough 18 g 0    carBAMazepine (TEGRETOL) 200 MG tablet TAKE 2 TABLETS (400 MG) BY MOUTH 4 TIMES DAILY 720 tablet 3    galcanezumab-gnlm (EMGALITY) 120 MG/ML injection Inject 1 mL (120 mg) Subcutaneous every 28 (twenty-eight) days 3 mL 3    ibuprofen (ADVIL,MOTRIN) 800 MG tablet TAKE 1 TABLET (800 MG) BY MOUTH 3 TIMES DAILY AS NEEDED 90 tablet 3     lisinopril-hydrochlorothiazide (PRINZIDE/ZESTORETIC) 10-12.5 MG tablet Take 1 tablet by mouth daily      multivitamin w/minerals (THERA-VIT-M) tablet Take 1 tablet by mouth daily      primidone (MYSOLINE) 250 MG tablet TAKE 1 TABLET (250 MG) BY MOUTH 4 TIMES DAILY 360 tablet 3    acetaminophen (TYLENOL) 325 MG tablet Take 2 tablets (650 mg) by mouth every 4 hours as needed for other (For optimal non-opioid multimodal pain management to improve pain control.) 30 tablet 0    glucosamine-chondroitin 500-400 MG CAPS per capsule Take 2 capsules by mouth daily (Patient not taking: Reported on 3/24/2023)      rizatriptan (MAXALT) 10 MG tablet Take 1 tablet (10 mg) by mouth at onset of headache for migraine May repeat in 2 hours. Max 2 tablets/24 hours. 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 Seizures have been controlled for almost 15 years. He had 2ry generalized tonic-clonic seizures in childhood and later had focal impaired-aware seizures. He is taking primidone 250 mg 4 times a day and carbamazepine 400 mg 4 times a day. Levels are therapeutic. Denies side effects.      #2 migraine headaches:  His headaches worsened in the past week. Overall they got better on Emgality.  I advised him not to take too much OTC pain medications since it can cause rebound headache. I advised him to take Maxalt as soon as he has an aura, and if had no aura, as soon as  headache starts.          - Continue ASDs as before.     - Continue galcanezumab 120 mg subcu every 28 days     -follow up in 1 year      As described above, I met with the patient for 25 minutes and during this time counseling was greater than 50% of the visit time. I spent an additional 10 min on chart review and documentation.   Nerissa Wooten MD

## 2024-03-10 ENCOUNTER — HOSPITAL ENCOUNTER (EMERGENCY)
Facility: CLINIC | Age: 54
Discharge: HOME | End: 2024-03-10
Attending: EMERGENCY MEDICINE | Admitting: EMERGENCY MEDICINE
Payer: COMMERCIAL

## 2024-03-10 ENCOUNTER — APPOINTMENT (OUTPATIENT)
Dept: CT IMAGING | Facility: CLINIC | Age: 54
End: 2024-03-10
Attending: EMERGENCY MEDICINE
Payer: COMMERCIAL

## 2024-03-10 VITALS
HEART RATE: 58 BPM | DIASTOLIC BLOOD PRESSURE: 66 MMHG | TEMPERATURE: 98.1 F | OXYGEN SATURATION: 96 % | RESPIRATION RATE: 20 BRPM | SYSTOLIC BLOOD PRESSURE: 114 MMHG

## 2024-03-10 DIAGNOSIS — R06.02 SHORTNESS OF BREATH: Primary | ICD-10-CM

## 2024-03-10 DIAGNOSIS — R07.9 CHEST PAIN, UNSPECIFIED TYPE: ICD-10-CM

## 2024-03-10 DIAGNOSIS — R07.89 CHEST TIGHTNESS: ICD-10-CM

## 2024-03-10 DIAGNOSIS — Z86.69 HISTORY OF SLEEP APNEA: ICD-10-CM

## 2024-03-10 DIAGNOSIS — I44.0 FIRST DEGREE ATRIOVENTRICULAR BLOCK: ICD-10-CM

## 2024-03-10 DIAGNOSIS — R91.1 PULMONARY NODULE: ICD-10-CM

## 2024-03-10 LAB
ALBUMIN SERPL BCG-MCNC: 3.9 G/DL (ref 3.5–5.2)
ALP SERPL-CCNC: 66 U/L (ref 40–150)
ALT SERPL W P-5'-P-CCNC: 20 U/L (ref 0–70)
ANION GAP SERPL CALCULATED.3IONS-SCNC: 7 MMOL/L (ref 7–15)
AST SERPL W P-5'-P-CCNC: 30 U/L (ref 0–45)
BASOPHILS # BLD AUTO: 0.1 10E3/UL (ref 0–0.2)
BASOPHILS NFR BLD AUTO: 1 %
BILIRUB SERPL-MCNC: 0.2 MG/DL
BUN SERPL-MCNC: 28.3 MG/DL (ref 6–20)
CALCIUM SERPL-MCNC: 9.3 MG/DL (ref 8.6–10)
CHLORIDE SERPL-SCNC: 103 MMOL/L (ref 98–107)
CREAT SERPL-MCNC: 0.84 MG/DL (ref 0.67–1.17)
D DIMER PPP FEU-MCNC: 0.95 UG/ML FEU (ref 0–0.5)
DEPRECATED HCO3 PLAS-SCNC: 29 MMOL/L (ref 22–29)
EGFRCR SERPLBLD CKD-EPI 2021: >90 ML/MIN/1.73M2
EOSINOPHIL # BLD AUTO: 0.2 10E3/UL (ref 0–0.7)
EOSINOPHIL NFR BLD AUTO: 2 %
ERYTHROCYTE [DISTWIDTH] IN BLOOD BY AUTOMATED COUNT: 14.2 % (ref 10–15)
FLUAV RNA SPEC QL NAA+PROBE: NEGATIVE
FLUBV RNA RESP QL NAA+PROBE: NEGATIVE
GLUCOSE SERPL-MCNC: 92 MG/DL (ref 70–99)
HCT VFR BLD AUTO: 42.8 % (ref 40–53)
HGB BLD-MCNC: 14.2 G/DL (ref 13.3–17.7)
IMM GRANULOCYTES # BLD: 0 10E3/UL
IMM GRANULOCYTES NFR BLD: 0 %
LIPASE SERPL-CCNC: 29 U/L (ref 13–60)
LYMPHOCYTES # BLD AUTO: 2.2 10E3/UL (ref 0.8–5.3)
LYMPHOCYTES NFR BLD AUTO: 33 %
MAGNESIUM SERPL-MCNC: 2.1 MG/DL (ref 1.7–2.3)
MCH RBC QN AUTO: 30.8 PG (ref 26.5–33)
MCHC RBC AUTO-ENTMCNC: 33.2 G/DL (ref 31.5–36.5)
MCV RBC AUTO: 93 FL (ref 78–100)
MONOCYTES # BLD AUTO: 0.8 10E3/UL (ref 0–1.3)
MONOCYTES NFR BLD AUTO: 11 %
NEUTROPHILS # BLD AUTO: 3.6 10E3/UL (ref 1.6–8.3)
NEUTROPHILS NFR BLD AUTO: 53 %
NRBC # BLD AUTO: 0 10E3/UL
NRBC BLD AUTO-RTO: 0 /100
NT-PROBNP SERPL-MCNC: <36 PG/ML (ref 0–900)
PLATELET # BLD AUTO: 200 10E3/UL (ref 150–450)
POTASSIUM SERPL-SCNC: 4.3 MMOL/L (ref 3.4–5.3)
PROT SERPL-MCNC: 7 G/DL (ref 6.4–8.3)
RBC # BLD AUTO: 4.61 10E6/UL (ref 4.4–5.9)
RSV RNA SPEC NAA+PROBE: NEGATIVE
SARS-COV-2 RNA RESP QL NAA+PROBE: NEGATIVE
SODIUM SERPL-SCNC: 139 MMOL/L (ref 135–145)
TROPONIN T SERPL HS-MCNC: 7 NG/L
TROPONIN T SERPL HS-MCNC: 7 NG/L
WBC # BLD AUTO: 6.8 10E3/UL (ref 4–11)

## 2024-03-10 PROCEDURE — 80053 COMPREHEN METABOLIC PANEL: CPT | Performed by: EMERGENCY MEDICINE

## 2024-03-10 PROCEDURE — 85025 COMPLETE CBC W/AUTO DIFF WBC: CPT | Performed by: EMERGENCY MEDICINE

## 2024-03-10 PROCEDURE — 87637 SARSCOV2&INF A&B&RSV AMP PRB: CPT | Performed by: EMERGENCY MEDICINE

## 2024-03-10 PROCEDURE — 83690 ASSAY OF LIPASE: CPT | Performed by: EMERGENCY MEDICINE

## 2024-03-10 PROCEDURE — 250N000011 HC RX IP 250 OP 636: Performed by: EMERGENCY MEDICINE

## 2024-03-10 PROCEDURE — 99285 EMERGENCY DEPT VISIT HI MDM: CPT | Mod: 25

## 2024-03-10 PROCEDURE — 84484 ASSAY OF TROPONIN QUANT: CPT | Performed by: EMERGENCY MEDICINE

## 2024-03-10 PROCEDURE — 250N000009 HC RX 250: Performed by: EMERGENCY MEDICINE

## 2024-03-10 PROCEDURE — 83735 ASSAY OF MAGNESIUM: CPT | Performed by: EMERGENCY MEDICINE

## 2024-03-10 PROCEDURE — 93005 ELECTROCARDIOGRAM TRACING: CPT

## 2024-03-10 PROCEDURE — 36415 COLL VENOUS BLD VENIPUNCTURE: CPT | Performed by: EMERGENCY MEDICINE

## 2024-03-10 PROCEDURE — 71275 CT ANGIOGRAPHY CHEST: CPT

## 2024-03-10 PROCEDURE — 85379 FIBRIN DEGRADATION QUANT: CPT | Performed by: EMERGENCY MEDICINE

## 2024-03-10 PROCEDURE — 83880 ASSAY OF NATRIURETIC PEPTIDE: CPT | Performed by: EMERGENCY MEDICINE

## 2024-03-10 RX ORDER — IOPAMIDOL 755 MG/ML
500 INJECTION, SOLUTION INTRAVASCULAR ONCE
Status: COMPLETED | OUTPATIENT
Start: 2024-03-10 | End: 2024-03-10

## 2024-03-10 RX ADMIN — SODIUM CHLORIDE 90 ML: 9 INJECTION, SOLUTION INTRAVENOUS at 09:03

## 2024-03-10 RX ADMIN — IOPAMIDOL 77 ML: 755 INJECTION, SOLUTION INTRAVENOUS at 09:00

## 2024-03-10 ASSESSMENT — ENCOUNTER SYMPTOMS
SHORTNESS OF BREATH: 1
RHINORRHEA: 0
CHEST TIGHTNESS: 1
VOMITING: 0
FEVER: 0
CHILLS: 0
COUGH: 0
DYSURIA: 0
MYALGIAS: 1
ABDOMINAL PAIN: 0
BACK PAIN: 0
HEMATURIA: 0

## 2024-03-10 ASSESSMENT — COLUMBIA-SUICIDE SEVERITY RATING SCALE - C-SSRS
6. HAVE YOU EVER DONE ANYTHING, STARTED TO DO ANYTHING, OR PREPARED TO DO ANYTHING TO END YOUR LIFE?: NO
2. HAVE YOU ACTUALLY HAD ANY THOUGHTS OF KILLING YOURSELF IN THE PAST MONTH?: NO
1. IN THE PAST MONTH, HAVE YOU WISHED YOU WERE DEAD OR WISHED YOU COULD GO TO SLEEP AND NOT WAKE UP?: NO

## 2024-03-10 ASSESSMENT — ACTIVITIES OF DAILY LIVING (ADL)
ADLS_ACUITY_SCORE: 38

## 2024-03-10 NOTE — Clinical Note
Michele Lezama was seen and treated in our emergency department on 3/10/2024.  He may return to work on 03/12/2024.       If you have any questions or concerns, please don't hesitate to call.      Abdullahi Osorio, DO

## 2024-03-10 NOTE — DISCHARGE INSTRUCTIONS
Please call patient regarding scheduling an appointment. Currently I do have same days open today. If she cannot come in today, she can use a same day tomorrow or see an alternate provider.   Please follow-up with your primary care provider and/or specialist regarding your visit to the ER today.    Please return to the emergency department should you experience any of the symptoms we specifically discussed, including but not limited to recurrence or worsening of your symptoms, or development of any new and concerning symptoms.    Please use your CPAP machine every night.

## 2024-03-10 NOTE — ED TRIAGE NOTES
Patient reports chest pain and tightness that started around 2am.  Patient states that he feels like he can't take a deep breath due to the pain.  Patient states he was started on a diuretic on Friday for swelling in his feet.      Triage Assessment (Adult)       Row Name 03/10/24 0706          Triage Assessment    Airway WDL WDL        Respiratory WDL    Respiratory WDL WDL        Skin Circulation/Temperature WDL    Skin Circulation/Temperature WDL WDL        Cardiac WDL    Cardiac WDL WDL        Peripheral/Neurovascular WDL    Peripheral Neurovascular WDL WDL        Cognitive/Neuro/Behavioral WDL    Cognitive/Neuro/Behavioral WDL WDL

## 2024-03-10 NOTE — ED PROVIDER NOTES
History     Chief Complaint:  Chest Pain       HPI   Michele Leazma is a 53 year old male presents to the emergency department for chest tightness and shortness of breath.  Patient states that he was recently prescribed with furosemide and took his first dose yesterday after seeing his primary care provider for increased leg swelling.  Patient states that around 2-3 this morning, he was awoken with chest tightness with intermittent sharp quality with associated shortness of breath.  Patient notes that his shortness of breath is worse with exertion.  He is uncertain whether or not his chest tightness/chest pain is worsened with exertion.  Patient states that the chest discomfort radiates and alternates between the left or right.  No other radiation.  No tearing quality.  Does not radiate to back.  He reports that his diuretic has led to good effect and he has been urinating frequently when initially taking a dose yesterday.  Patient states that he had similar episode roughly 1 year ago.  He endorses some nausea this morning with his migraine headache which is not new.  Patient  endorses that he was told in the past that his headache is a rebound headache from taking over-the-counter medications.    Review of Systems   Constitutional:  Negative for chills and fever.   HENT:  Negative for congestion and rhinorrhea.    Respiratory:  Positive for chest tightness and shortness of breath. Negative for cough.    Cardiovascular:  Positive for chest pain and leg swelling.   Gastrointestinal:  Negative for abdominal pain and vomiting.   Genitourinary:  Negative for dysuria and hematuria.   Musculoskeletal:  Positive for myalgias. Negative for back pain.     Independent Historian:   None - Patient Only    Review of External Notes:   3/8/24 Office visit note       Medications:    acetaminophen (TYLENOL) 325 MG tablet  albuterol (PROAIR HFA/PROVENTIL HFA/VENTOLIN HFA) 108 (90 Base) MCG/ACT inhaler  carBAMazepine (TEGRETOL) 200  MG tablet  galcanezumab-gnlm (EMGALITY) 120 MG/ML injection  glucosamine-chondroitin 500-400 MG CAPS per capsule  ibuprofen (ADVIL,MOTRIN) 800 MG tablet  lisinopril-hydrochlorothiazide (PRINZIDE/ZESTORETIC) 10-12.5 MG tablet  multivitamin w/minerals (THERA-VIT-M) tablet  primidone (MYSOLINE) 250 MG tablet  rizatriptan (MAXALT) 10 MG tablet        Past Medical History:    Past Medical History:   Diagnosis Date    Arthritis     GERD (gastroesophageal reflux disease)     GERD (gastroesophageal reflux disease)     Hypertension     Hypertension     Migraine     Migraine     Morbid obesity (H)     Obese     Osteoarthritis     Seizure, petit mal (H) 2008    Seizures (H)     Sleep apnea     Sleep apnea        Past Surgical History:    Past Surgical History:   Procedure Laterality Date    ARTHROPLASTY KNEE Left 7/26/2021    Procedure: LEFT TOTAL KNEE ARTHROPLASTY;  Surgeon: Karri Ceja MD;  Location: [facility] OR    ARTHROSCOPY KNEE      EXAM UNDER ANESTHESIA, MANIPULATE JOINT (LOCATION) Right 7/26/2021    Procedure: RIGHT KNEE MANIPULATION;  Surgeon: Karri Ceja MD;  Location: [facility] OR    EYE SURGERY      HC KNEE SCOPE,SINGLE MENISECTOMY Left 12/13/2016    Procedure: KNEE ARTHROSCOPY WITH MENISECTOMY, LEFT, PARTIAL MEDIAL MENISECTOMY ;  Surgeon: Karri Ceja MD;  Location: [facility] OR;  Service: Orthopedics    OTHER SURGICAL HISTORY      carpel tunnel    TONSILLECTOMY      ZZC TOTAL KNEE ARTHROPLASTY Right 11/4/2020    Procedure: RIGHT TOTAL KNEE ARTHROPLASTY;  Surgeon: Karri Ceja MD;  Location: [facility];  Service: Orthopedics        Physical Exam   Patient Vitals for the past 24 hrs:   BP Temp Temp src Pulse Resp SpO2   03/10/24 1045 114/66 -- -- 58 20 96 %   03/10/24 1015 112/78 -- -- 65 15 97 %   03/10/24 1000 108/87 -- -- 57 19 96 %   03/10/24 0915 126/87 -- -- -- -- --   03/10/24 0845 129/75 -- -- 50 20 95 %   03/10/24 0815 125/79 -- -- 56 14 95 %   03/10/24 0800 126/73  -- -- 50 20 96 %   03/10/24 0740 134/81 -- -- 56 18 96 %   03/10/24 0720 138/87 -- -- 58 16 --   03/10/24 0708 (!) 145/93 98.1  F (36.7  C) Oral 58 18 96 %      Constitutional:       Appearance: Normal appearance.   HENT:      Head: Normocephalic and atraumatic.   Eyes:      Extraocular Movements: Extraocular movements intact.      Conjunctiva/sclera: Conjunctivae normal.   Cardiovascular:      Rate and Rhythm: Normal rate and regular rhythm.   Pulmonary:      Effort: Pulmonary effort is normal. No respiratory distress.      Breath sounds: Clear to auscultation bilaterally.  Abdominal:      General: Abdomen is flat. There is no distension.      Palpations: Abdomen is soft.      Tenderness: There is no abdominal tenderness.   Musculoskeletal:      Cervical back: Normal range of motion. No rigidity.       Right lower leg: Mild pitting edema.      Left lower leg: Mild pitting edema.   Skin:     General: Skin is warm and dry.   Neurological:      General: No focal deficit present.      Mental Status: Alert and oriented to person, place, and time.   Psychiatric:         Mood and Affect: Mood normal.         Behavior: Behavior normal.    Emergency Department Course   ECG  ECG results from 03/10/24   EKG 12 lead     Value    Systolic Blood Pressure     Diastolic Blood Pressure     Ventricular Rate 58    Atrial Rate 58    MA Interval 278    QRS Duration 80        QTc 394    P Axis 54    R AXIS 1    T Axis 62    Interpretation ECG      Sinus bradycardia with 1st degree A-V block  Possible Inferior infarct (cited on or before 14-MAR-2023)  Abnormal ECG  When compared with ECG of 14-MAR-2023 06:35,  No significant change was found       See ED course for independent interpretation.     Imaging:  CT Chest Pulmonary Embolism w Contrast   Final Result   IMPRESSION:   1.  No pulmonary emboli. No acute findings in the chest.      Exercise Stress Echocardiogram    (Results Pending)      Report per  radiology    Laboratory:  Labs Ordered and Resulted from Time of ED Arrival to Time of ED Departure   COMPREHENSIVE METABOLIC PANEL - Abnormal       Result Value    Sodium 139      Potassium 4.3      Carbon Dioxide (CO2) 29      Anion Gap 7      Urea Nitrogen 28.3 (*)     Creatinine 0.84      GFR Estimate >90      Calcium 9.3      Chloride 103      Glucose 92      Alkaline Phosphatase 66      AST 30      ALT 20      Protein Total 7.0      Albumin 3.9      Bilirubin Total 0.2     D DIMER QUANTITATIVE - Abnormal    D-Dimer Quantitative 0.95 (*)    TROPONIN T, HIGH SENSITIVITY - Normal    Troponin T, High Sensitivity 7     MAGNESIUM - Normal    Magnesium 2.1     NT PROBNP INPATIENT - Normal    N terminal Pro BNP Inpatient <36     INFLUENZA A/B, RSV, & SARS-COV2 PCR - Normal    Influenza A PCR Negative      Influenza B PCR Negative      RSV PCR Negative      SARS CoV2 PCR Negative     LIPASE - Normal    Lipase 29     TROPONIN T, HIGH SENSITIVITY - Normal    Troponin T, High Sensitivity 7     CBC WITH PLATELETS AND DIFFERENTIAL    WBC Count 6.8      RBC Count 4.61      Hemoglobin 14.2      Hematocrit 42.8      MCV 93      MCH 30.8      MCHC 33.2      RDW 14.2      Platelet Count 200      % Neutrophils 53      % Lymphocytes 33      % Monocytes 11      % Eosinophils 2      % Basophils 1      % Immature Granulocytes 0      NRBCs per 100 WBC 0      Absolute Neutrophils 3.6      Absolute Lymphocytes 2.2      Absolute Monocytes 0.8      Absolute Eosinophils 0.2      Absolute Basophils 0.1      Absolute Immature Granulocytes 0.0      Absolute NRBCs 0.0          Emergency Department Course & Assessments:       Interventions:  Medications   CT Scan Flush (90 mLs Intravenous $Given 3/10/24 0903)   iopamidol (ISOVUE-370) solution 500 mL (77 mLs Intravenous $Given 3/10/24 0900)        Independent Interpretation (X-rays, CTs, rhythm strip):  None    Assessments/Consultations/Discussion of Management or Tests:  ED Course as of  03/10/24 1816   Sun Mar 10, 2024   0716 EKG 12 lead  Sinus rated cardia with first-degree AV block.  Rate of 58.  .  QRS of 80.  QTc of 394.  No ST elevation or depression as compared with 3/14/2023 EKG.   0838 D-Dimer Quantitative(!): 0.95  CT PE   1050 Troponin T, High Sensitivity: 7  2 set flat   1108 Patient updated on lab and image findings including incidental findings of pulmonary nodule. On my reevaluation, patient saturating 90% on room air while sleeping, but rapidly improved with awakening. No chest pain or shortness of breath at this time. Wife voiced concern that patient actually had more so of a panic attack in today's episode when he woke up with shortness of breath and chest tightness. After further questioning, patient reports that he has obstructive sleep apnea, but wife reports that patient has been noncompliant with his CPAP machine usage.  With this new information, patient's clinical presentation more consistent with shortness of breath secondary to sleep apnea given the sudden onset chest tightness and waking up in middle night unable to breathe.  I advised the patient that he needs to continue to wear his CPAP machine. While lower in suspicion for ACS given 2 set negative cardiac enzymes and unremarkable EKG. Patient does have moderate risk heart score of 4. However, patient is adamant that he does not want to stay in the hospital. Had extension discussion regarding patient's 6 week risk for major adverse cardiac event of nearing 20% and offered observation admission for completion of cardiac work-up, but patient declined and voiced understanding of risk/benefit of admission. Instead, we will discharge patient to outpatient cardiology referral and stress testing referral. Discussed return precautions.  Answered all questions.  Patient voiced understanding and agreement with plan.   1143 Walking pulse ox 95%, will discharge.       Social Determinants of Health affecting care:    None    Disposition:  The patient was discharged to home.     Impression & Plan    CMS Diagnoses: None    Medical Decision Makin-year-old male as described above presents to the emergency department for chest tightness/chest pain and shortness of breath.  Patient hemodynamically stable at time of evaluation.  Afebrile.  EKG obtained on initial evaluation negative for ST elevation or depression.  Differential diagnosis considered includes, but not limited to, CHF exacerbation, ACS, pulmonary embolism, thoracic aortic dissection.  Cardiac workup ordered.  2 set cardiac enzymes.  D-dimer testing for PE evaluation and indirect evaluation for dissection.  Nonclassic presentation for dissection nonetheless and even bilateral radial pulses, dissection less likely.  Imaging modality pending D-dimer.  Discussed care plan with patient and family who voiced understanding and agreement with plan.  Answered all questions.  Additional workup and orders as listed in chart.    Please refer to ED course above as part of continuation of MDM for details on the patient's treatment course and any changes or updates in care plan beyond my initial evaluation and MDM creation.      Diagnosis:    ICD-10-CM    1. Shortness of breath  R06.02 Follow-Up with Cardiology     Exercise Stress Echocardiogram      2. Chest tightness  R07.89 Follow-Up with Cardiology     Exercise Stress Echocardiogram      3. Pulmonary nodule  R91.1       4. First degree atrioventricular block  I44.0       5. History of sleep apnea  Z86.69       6. Chest pain, unspecified type  R07.9 Follow-Up with Cardiology     Exercise Stress Echocardiogram           Discharge Medications:  Discharge Medication List as of 3/10/2024 11:42 AM         TARI MILLER DO  3/10/2024   Tari Miller DO Yeh, Ferris, DO  03/10/24 1816

## 2024-03-11 LAB
ATRIAL RATE - MUSE: 58 BPM
DIASTOLIC BLOOD PRESSURE - MUSE: NORMAL MMHG
INTERPRETATION ECG - MUSE: NORMAL
P AXIS - MUSE: 54 DEGREES
PR INTERVAL - MUSE: 278 MS
QRS DURATION - MUSE: 80 MS
QT - MUSE: 402 MS
QTC - MUSE: 394 MS
R AXIS - MUSE: 1 DEGREES
SYSTOLIC BLOOD PRESSURE - MUSE: NORMAL MMHG
T AXIS - MUSE: 62 DEGREES
VENTRICULAR RATE- MUSE: 58 BPM

## 2024-03-22 ENCOUNTER — HOSPITAL ENCOUNTER (OUTPATIENT)
Dept: CARDIOLOGY | Facility: CLINIC | Age: 54
Discharge: HOME | End: 2024-03-22
Attending: EMERGENCY MEDICINE | Admitting: EMERGENCY MEDICINE
Payer: COMMERCIAL

## 2024-03-22 DIAGNOSIS — R07.89 CHEST TIGHTNESS: ICD-10-CM

## 2024-03-22 DIAGNOSIS — R06.02 SHORTNESS OF BREATH: ICD-10-CM

## 2024-03-22 DIAGNOSIS — R07.9 CHEST PAIN, UNSPECIFIED TYPE: ICD-10-CM

## 2024-03-22 PROCEDURE — 93321 DOPPLER ECHO F-UP/LMTD STD: CPT | Mod: 26 | Performed by: INTERNAL MEDICINE

## 2024-03-22 PROCEDURE — 255N000002 HC RX 255 OP 636: Performed by: EMERGENCY MEDICINE

## 2024-03-22 PROCEDURE — 93325 DOPPLER ECHO COLOR FLOW MAPG: CPT | Mod: 26 | Performed by: INTERNAL MEDICINE

## 2024-03-22 PROCEDURE — 93016 CV STRESS TEST SUPVJ ONLY: CPT | Performed by: INTERNAL MEDICINE

## 2024-03-22 PROCEDURE — 93350 STRESS TTE ONLY: CPT | Mod: 26 | Performed by: INTERNAL MEDICINE

## 2024-03-22 PROCEDURE — 93325 DOPPLER ECHO COLOR FLOW MAPG: CPT | Mod: TC

## 2024-03-22 PROCEDURE — 93018 CV STRESS TEST I&R ONLY: CPT | Performed by: INTERNAL MEDICINE

## 2024-03-22 RX ADMIN — HUMAN ALBUMIN MICROSPHERES AND PERFLUTREN 4 ML: 10; .22 INJECTION, SOLUTION INTRAVENOUS at 13:39

## 2024-04-03 ENCOUNTER — MYC MEDICAL ADVICE (OUTPATIENT)
Dept: NEUROLOGY | Facility: CLINIC | Age: 54
End: 2024-04-03
Payer: COMMERCIAL

## 2024-04-03 NOTE — TELEPHONE ENCOUNTER
[facility] paperwork faxed to [facility] for completion. Dr. Clark will not be in [place] until 4/19/2024.

## 2024-04-03 NOTE — LETTER
April 17, 2024      Michele Lezama  [address] [address]        To Whom It May Concern:    Michele Lezama is under my care for the treatment of his migraines. Please excuse his absence on 4/17, as this patient was unable to perform the duties of his job due to a migraine headache.         Sincerely,        Nerissa Wooten MD

## 2024-04-03 NOTE — LETTER
April 11, 2024      Michele Lezama  [address] [address] [address]        To Whom It May Concern:    Michele Lezama is under my care for the treatment of his migraines. Please excuse his absences on 3/20 and 3/28, as this patient was unable to perform the duties of his job due to episodic migraines.        Sincerely,        Nerissa Wooten MD

## 2024-04-19 NOTE — TELEPHONE ENCOUNTER
Updated [facility] paperwork has been signed by Dr. Wooten and faxed to [place] at [phone]. Confirmation of successful delivery received via RightFax.    JOVI WaldropN RN Care Coordinator  Neurology/Neurosurgery/PM&R/Pain Management

## 2024-06-11 DIAGNOSIS — G40.209 PARTIAL SYMPTOMATIC EPILEPSY WITH COMPLEX PARTIAL SEIZURES, NOT INTRACTABLE, WITHOUT STATUS EPILEPTICUS (H): ICD-10-CM

## 2024-06-20 RX ORDER — GALCANEZUMAB 120 MG/ML
120 INJECTION, SOLUTION SUBCUTANEOUS
Qty: 1 ML | Refills: 5 | Status: SHIPPED | OUTPATIENT
Start: 2024-06-20

## 2024-06-20 NOTE — TELEPHONE ENCOUNTER
EMGALITY 120 MG/ML PEN      Last Written Prescription Date:  3/8/24  Last Fill Quantity: 3 ml ,   # refills: 3   Last Office Visit : 10/6/2023  Future Office visit:  3/7/2025    Routing refill request to provider for review/approval because:  Emgality not on the [facility], [facility] or [facility] refill protocol

## 2024-07-07 ENCOUNTER — HEALTH MAINTENANCE LETTER (OUTPATIENT)
Age: 54
End: 2024-07-07

## 2024-08-22 ENCOUNTER — MYC REFILL (OUTPATIENT)
Dept: NEUROLOGY | Facility: CLINIC | Age: 54
End: 2024-08-22
Payer: COMMERCIAL

## 2024-08-22 ENCOUNTER — TELEPHONE (OUTPATIENT)
Dept: NEUROLOGY | Facility: CLINIC | Age: 54
End: 2024-08-22
Payer: COMMERCIAL

## 2024-08-22 DIAGNOSIS — G40.209 PARTIAL SYMPTOMATIC EPILEPSY WITH COMPLEX PARTIAL SEIZURES, NOT INTRACTABLE, WITHOUT STATUS EPILEPTICUS (H): ICD-10-CM

## 2024-08-22 RX ORDER — GALCANEZUMAB 120 MG/ML
120 INJECTION, SOLUTION SUBCUTANEOUS
Qty: 1 ML | Refills: 5 | Status: CANCELLED | OUTPATIENT
Start: 2024-08-22

## 2024-08-22 NOTE — TELEPHONE ENCOUNTER
Prior Authorization Retail Medication Request    Medication/Dose: galcanezumab-gnlm (EMGALITY) 120 MG/ML injection   Diagnosis and ICD code (if different than what is on RX):  Partial symptomatic epilepsy with complex partial seizures, not intractable, without status epilepticus (H) [G40.209]   New/renewal/insurance change PA/secondary ins. PA:  Previously Tried and Failed:  see chart  Rationale:  see chart    Insurance   Primary: [employer]  Insurance ID:  [id number]     Secondary (if applicable):NA  Insurance ID:  NA    Pharmacy Information (if different than what is on RX)  Name:  [facility] [place]  Phone:  [phone]

## 2024-08-26 NOTE — TELEPHONE ENCOUNTER
Central Prior Authorization Team - Phone: [phone]     PA Initiation    Medication: EMGALITY 120 MG/ML SC SOAJ  Insurance Company: [employer] - Phone [phone] Fax [phone]  Pharmacy Filling the Rx: [id number] - [place], [place] - [address]  Filling Pharmacy Phone: [phone]  Filling Pharmacy Fax:    Start Date: 8/26/2024

## 2024-08-28 NOTE — TELEPHONE ENCOUNTER
Central Prior Authorization Team - Phone: [phone]     Prior Authorization Approval    Medication: EMGALITY 120 MG/ML SC SOAJ  Authorization Effective Date: 8/27/2024  Authorization Expiration Date: 8/27/2025  Approved Dose/Quantity: 1  Reference #:     Insurance Company: [employer] - Phone [phone] Fax [phone]  Expected CoPay: $    CoPay Card Available:      Financial Assistance Needed:   Which Pharmacy is filling the prescription: [id number] [place] - [address] [facility]  Pharmacy Notified: YES  Patient Notified: YES  [email or web address] message sent and Instructed pharmacy to notify patient once order is ready.

## 2024-10-15 ENCOUNTER — MYC MEDICAL ADVICE (OUTPATIENT)
Dept: NEUROLOGY | Facility: CLINIC | Age: 54
End: 2024-10-15
Payer: COMMERCIAL

## 2024-11-08 ENCOUNTER — VIRTUAL VISIT (OUTPATIENT)
Dept: NEUROLOGY | Facility: CLINIC | Age: 54
End: 2024-11-08
Payer: COMMERCIAL

## 2024-11-08 DIAGNOSIS — S09.90XA TRAUMATIC INJURY OF HEAD, INITIAL ENCOUNTER: ICD-10-CM

## 2024-11-08 DIAGNOSIS — G40.209 PARTIAL SYMPTOMATIC EPILEPSY WITH COMPLEX PARTIAL SEIZURES, NOT INTRACTABLE, WITHOUT STATUS EPILEPTICUS (H): ICD-10-CM

## 2024-11-08 DIAGNOSIS — G43.019 INTRACTABLE MIGRAINE WITHOUT AURA AND WITHOUT STATUS MIGRAINOSUS: Primary | ICD-10-CM

## 2024-11-08 PROCEDURE — 99213 OFFICE O/P EST LOW 20 MIN: CPT | Mod: 95 | Performed by: PSYCHIATRY & NEUROLOGY

## 2024-11-08 PROCEDURE — G2211 COMPLEX E/M VISIT ADD ON: HCPCS | Performed by: PSYCHIATRY & NEUROLOGY

## 2024-11-08 RX ORDER — GALCANEZUMAB 120 MG/ML
120 INJECTION, SOLUTION SUBCUTANEOUS
Qty: 3 ML | Refills: 3 | Status: SHIPPED | OUTPATIENT
Start: 2024-11-08

## 2024-11-08 NOTE — PROGRESS NOTES
Michele is a 54 year old who is being evaluated via a billable video visit.    How would you like to obtain your AVS? MyChart  If the video visit is dropped, the invitation should be resent by: Text to cell phone: [phone]  Will anyone else be joining your video visit? No    Video-Visit Details    Type of service:  Video Visit   Originating Location (pt. Location): Home    Distant Location (provider location):  On-site  Platform used for Video Visit: ORI Fry, SELVIN ([facility])     NEUROLOGY PROGRESS NOTE   [facility]    Patient:Michele Lezama  : 1970  Age: 54 year old  Today's Virtual Visit: 2024    History of present illness:     Mr. Michele Lezama is participating in this virtual visit for a follow up for his epilepsy.  He was last seen 3/2024.  He reports no seizures since last visit.   He is taking carbamazepine 400 mg 4 times a day and primidone 250 mg 4 times a day.   His last seizure was 2008 due to missing medication.     Today, his main concern is his worsening headaches after a fall on 24.  He tripped in the basement and fell on the cement and hit his head hard and his headaches worsened.  He was seen at an Urgent care, his main complaint was pain in his extremities; hematoma on right mid shin region noted.  He continued having discomfort in this area and was seen at ER .  He had XR of extremities and chest.   His headaches happen twice a week, sometimes it's throbbing, has photosensitivity and phonosensitivity. Now he has a migraine headache, which is 7-8/10, sometimes it's less or more.  He gets nauseous.  They can last up to 2 days.  He tried Maxalt, but it makes him drowsy, so he doesn't use it anymore.  He takes an Excedrin, before it was helpful, but it doesn't help anymore.       ASD levels 3/1/2024:  Carbamazepine: 8.2  Phenobarbital:20.4  Primidone 9.2      Current Outpatient Medications   Medication Sig Dispense Refill    albuterol (PROAIR HFA/PROVENTIL  HFA/VENTOLIN HFA) 108 (90 Base) MCG/ACT inhaler Inhale 2 puffs into the lungs every 6 hours as needed for shortness of breath, wheezing or cough 18 g 0    carBAMazepine (TEGRETOL) 200 MG tablet TAKE 2 TABLETS (400 MG) BY MOUTH 4 TIMES DAILY 720 tablet 3    galcanezumab-gnlm (EMGALITY) 120 MG/ML injection INJECT 1 ML (120 MG) SUBCUTANEOUS EVERY 28 (TWENTY-EIGHT) DAYS 1 mL 5    glucosamine-chondroitin 500-400 MG CAPS per capsule Take 2 capsules by mouth daily.      ibuprofen (ADVIL,MOTRIN) 800 MG tablet TAKE 1 TABLET (800 MG) BY MOUTH 3 TIMES DAILY AS NEEDED 90 tablet 3    lisinopril-hydrochlorothiazide (PRINZIDE/ZESTORETIC) 10-12.5 MG tablet Take 1 tablet by mouth daily      multivitamin w/minerals (THERA-VIT-M) tablet Take 1 tablet by mouth daily      primidone (MYSOLINE) 250 MG tablet TAKE 1 TABLET (250 MG) BY MOUTH 4 TIMES DAILY 360 tablet 3    rizatriptan (MAXALT) 10 MG tablet Take 1 tablet (10 mg) by mouth at onset of headache for migraine May repeat in 2 hours. Max 2 tablets/24 hours. 12 tablet 5    acetaminophen (TYLENOL) 325 MG tablet Take 2 tablets (650 mg) by mouth every 4 hours as needed for other (For optimal non-opioid multimodal pain management to improve pain control.) 30 tablet 0     Exam:    There were no vitals taken for this visit.     Wt Readings from Last 5 Encounters:   10/06/23 144.7 kg (319 lb)   08/17/23 147.4 kg (325 lb)   03/24/23 142.9 kg (315 lb)   03/14/23 (!) 150.8 kg (332 lb 7.3 oz)   02/04/23 147.4 kg (325 lb)     Alert, awake, NAD, no aphasia or dysarthria, EOMI, face is symmetric, moves upper extremities against gravity.      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 Seizures have been controlled for almost 15 years. He had 2ry generalized tonic-clonic seizures in childhood and later had focal impaired-aware seizures. He is taking primidone 250 mg 4 times a day and carbamazepine 400 mg 4 times a day. Levels are therapeutic. Denies side effects.      #2  migraine headaches:  His headaches worsened in the past month after a fall end of September.  They improved on Emgality and they used to respond to Excedrin migraine, but they don't anymore.  Maxalt made him drowsy, so he stopped taking it.  He wants to try Nurtec. I discussed the need for prior authorization, he doesn't want to try any other triptans.  I discussed getting a head CT to rule out intracranial bleed or other acute abnormalities.  I will put a prescription for rimegepant.  Will continue galcanezumab.        - Continue ASDs as before.     - Continue galcanezumab 120 mg subcu every 28 days    - Take rimegepant 75 mg as needed at onset of headache. Do not exceed 1 tablet in 24 hours.     - Head CT      -follow up in 6 months      As described above, I met with the patient for 16 minutes and during this time counseling was greater than 50% of the visit time.  I spent an additional 15 minutes in chart review and documentation. This note was created in part by the use of Dragon voice recognition system. Inadvertent grammatical errors and typographical errors may still exist.  Nerissa Wooten MD

## 2024-11-08 NOTE — LETTER
2024      Michele Lezama  [address] [address] [facility] [address]      Dear Colleague,      Thank you for referring your patient, Michele Lezama, to the [facility] NEUROLOGY CLINIC [place]. Please see a copy of my visit note below.    Michele is a 54 year old who is being evaluated via a billable video visit.    How would you like to obtain your AVS? MyChart  If the video visit is dropped, the invitation should be resent by: Text to cell phone: [phone]  Will anyone else be joining your video visit? No       NEUROLOGY PROGRESS NOTE   [facility]    Patient:Michele Lezama  : 1970  Age: 54 year old  Today's Virtual Visit: 2024    History of present illness:     Mr. Michele Lezama is participating in this virtual visit for a follow up for his epilepsy.  He was last seen 3/2024.  He reports no seizures since last visit.   He is taking carbamazepine 400 mg 4 times a day and primidone 250 mg 4 times a day.   His last seizure was 2008 due to missing medication.     Today, his main concern is his worsening headaches after a fall on 24.  He tripped in the basement and fell on the cement and hit his head hard and his headaches worsened.  He was seen at an Urgent care, his main complaint was pain in his extremities; hematoma on right mid shin region noted.  He continued having discomfort in this area and was seen at ER .  He had XR of extremities and chest.   His headaches happen twice a week, sometimes it's throbbing, has photosensitivity and phonosensitivity. Now he has a migraine headache, which is 7-8/10, sometimes it's less or more.  He gets nauseous.  They can last up to 2 days.  He tried Maxalt, but it makes him drowsy, so he doesn't use it anymore.  He takes an Excedrin, before it was helpful, but it doesn't help anymore.       ASD levels 3/1/2024:  Carbamazepine: 8.2  Phenobarbital:20.4  Primidone 9.2      Current Outpatient Medications   Medication Sig Dispense Refill     albuterol (PROAIR HFA/PROVENTIL HFA/VENTOLIN HFA) 108 (90 Base) MCG/ACT inhaler Inhale 2 puffs into the lungs every 6 hours as needed for shortness of breath, wheezing or cough 18 g 0    carBAMazepine (TEGRETOL) 200 MG tablet TAKE 2 TABLETS (400 MG) BY MOUTH 4 TIMES DAILY 720 tablet 3    galcanezumab-gnlm (EMGALITY) 120 MG/ML injection INJECT 1 ML (120 MG) SUBCUTANEOUS EVERY 28 (TWENTY-EIGHT) DAYS 1 mL 5    glucosamine-chondroitin 500-400 MG CAPS per capsule Take 2 capsules by mouth daily.      ibuprofen (ADVIL,MOTRIN) 800 MG tablet TAKE 1 TABLET (800 MG) BY MOUTH 3 TIMES DAILY AS NEEDED 90 tablet 3    lisinopril-hydrochlorothiazide (PRINZIDE/ZESTORETIC) 10-12.5 MG tablet Take 1 tablet by mouth daily      multivitamin w/minerals (THERA-VIT-M) tablet Take 1 tablet by mouth daily      primidone (MYSOLINE) 250 MG tablet TAKE 1 TABLET (250 MG) BY MOUTH 4 TIMES DAILY 360 tablet 3    rizatriptan (MAXALT) 10 MG tablet Take 1 tablet (10 mg) by mouth at onset of headache for migraine May repeat in 2 hours. Max 2 tablets/24 hours. 12 tablet 5    acetaminophen (TYLENOL) 325 MG tablet Take 2 tablets (650 mg) by mouth every 4 hours as needed for other (For optimal non-opioid multimodal pain management to improve pain control.) 30 tablet 0     Exam:    There were no vitals taken for this visit.     Wt Readings from Last 5 Encounters:   10/06/23 144.7 kg (319 lb)   08/17/23 147.4 kg (325 lb)   03/24/23 142.9 kg (315 lb)   03/14/23 (!) 150.8 kg (332 lb 7.3 oz)   02/04/23 147.4 kg (325 lb)     Alert, awake, NAD, no aphasia or dysarthria, EOMI, face is symmetric, moves upper extremities against gravity.      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 Seizures have been controlled for almost 15 years. He had 2ry generalized tonic-clonic seizures in childhood and later had focal impaired-aware seizures. He is taking primidone 250 mg 4 times a day and carbamazepine 400 mg 4 times a day. Levels are  therapeutic. Denies side effects.      #2 migraine headaches:  His headaches worsened in the past month after a fall end of September.  They improved on Emgality and they used to respond to Excedrin migraine, but they don't anymore.  Maxalt made him drowsy, so he stopped taking it.  He wants to try Nurtec. I discussed the need for prior authorization, he doesn't want to try any other triptans.  I discussed getting a head CT to rule out intracranial bleed or other acute abnormalities.  I will put a prescription for rimegepant.  Will continue galcanezumab.        - Continue ASDs as before.     - Continue galcanezumab 120 mg subcu every 28 days    - Take rimegepant 75 mg as needed at onset of headache. Do not exceed 1 tablet in 24 hours.     - Head CT      -follow up in 6 months      As described above, I met with the patient for 16 minutes and during this time counseling was greater than 50% of the visit time.  I spent an additional 15 minutes in chart review and documentation. This note was created in part by the use of Dragon voice recognition system. Inadvertent grammatical errors and typographical errors may still exist.        Again, thank you for allowing me to participate in the care of your patient.      Sincerely,    Nerissa Wooten MD

## 2024-11-10 ENCOUNTER — TELEPHONE (OUTPATIENT)
Dept: NEUROLOGY | Facility: CLINIC | Age: 54
End: 2024-11-10

## 2024-11-11 NOTE — TELEPHONE ENCOUNTER
Retail Pharmacy Prior Authorization Team   Phone: [phone]    PRIOR AUTHORIZATION DENIED    DRUG IS APPROVED AT 16 TABLETS PER MONTH    Medication: NURTEC 75 MG PO TBDP  Insurance Company: [employer] - Phone [phone] Fax [phone]  Denial Date: 11/9/2024  Denial Reason(s): EXCEEDS PLAN LIMITS - 16 TABLETS PER MONTH  Appeal Information: IF THE PROVIDER WOULD LIKE TO APPEAL THIS DECISION PLEASE PROVIDE THE PA TEAM WITH A LETTER OF MEDICAL NECESSITY      Patient Notified: NO

## 2025-02-21 ENCOUNTER — HOSPITAL ENCOUNTER (OUTPATIENT)
Dept: CT IMAGING | Facility: CLINIC | Age: 55
Discharge: HOME | End: 2025-02-21
Attending: PSYCHIATRY & NEUROLOGY | Admitting: PSYCHIATRY & NEUROLOGY
Payer: COMMERCIAL

## 2025-02-21 ENCOUNTER — VIRTUAL VISIT (OUTPATIENT)
Dept: NEUROLOGY | Facility: CLINIC | Age: 55
End: 2025-02-21
Payer: COMMERCIAL

## 2025-02-21 DIAGNOSIS — G40.209 PARTIAL SYMPTOMATIC EPILEPSY WITH COMPLEX PARTIAL SEIZURES, NOT INTRACTABLE, WITHOUT STATUS EPILEPTICUS (H): ICD-10-CM

## 2025-02-21 DIAGNOSIS — G44.001 INTRACTABLE CLUSTER HEADACHE SYNDROME, UNSPECIFIED CHRONICITY PATTERN: Primary | ICD-10-CM

## 2025-02-21 DIAGNOSIS — S09.90XA TRAUMATIC INJURY OF HEAD, INITIAL ENCOUNTER: ICD-10-CM

## 2025-02-21 DIAGNOSIS — G43.719 INTRACTABLE CHRONIC MIGRAINE WITHOUT AURA AND WITHOUT STATUS MIGRAINOSUS: ICD-10-CM

## 2025-02-21 PROCEDURE — G2211 COMPLEX E/M VISIT ADD ON: HCPCS | Performed by: PSYCHIATRY & NEUROLOGY

## 2025-02-21 PROCEDURE — 98005 SYNCH AUDIO-VIDEO EST LOW 20: CPT | Performed by: PSYCHIATRY & NEUROLOGY

## 2025-02-21 PROCEDURE — 70450 CT HEAD/BRAIN W/O DYE: CPT

## 2025-02-21 RX ORDER — CARBAMAZEPINE 200 MG/1
TABLET ORAL
Qty: 700 TABLET | Refills: 3 | Status: SHIPPED | OUTPATIENT
Start: 2025-02-21

## 2025-02-21 RX ORDER — GALCANEZUMAB 120 MG/ML
120 INJECTION, SOLUTION SUBCUTANEOUS
Qty: 3 ML | Refills: 3 | Status: SHIPPED | OUTPATIENT
Start: 2025-02-21

## 2025-02-21 RX ORDER — PRIMIDONE 250 MG/1
TABLET ORAL
Qty: 360 TABLET | Refills: 3 | Status: SHIPPED | OUTPATIENT
Start: 2025-02-21

## 2025-02-21 RX ORDER — SUMATRIPTAN SUCCINATE 100 MG/1
100 TABLET ORAL
Qty: 18 TABLET | Refills: 5 | Status: SHIPPED | OUTPATIENT
Start: 2025-02-21

## 2025-02-21 RX ORDER — RIZATRIPTAN BENZOATE 10 MG/1
10 TABLET ORAL
Qty: 12 TABLET | Refills: 5 | Status: SHIPPED | OUTPATIENT
Start: 2025-02-21 | End: 2025-02-21

## 2025-02-21 RX ORDER — VERAPAMIL HYDROCHLORIDE 80 MG/1
TABLET ORAL
Qty: 180 TABLET | Refills: 1 | Status: SHIPPED | OUTPATIENT
Start: 2025-02-21

## 2025-02-21 NOTE — LETTER
"2025      Michele Lezama  [address] [facility] [address] [facility] [address]      Dear Colleague,    Thank you for referring your patient, Michele Lezama, to the [facility] NEUROLOGY CLINIC [place]. Please see a copy of my visit note below.    Michele is a 54 year old who is being evaluated via a billable video visit.    How would you like to obtain your AVS? MyChart  If the video visit is dropped, the invitation should be resent by: Text to cell phone: [phone]  Will anyone else be joining your video visit? No  {If patient encounters technical issues they should call [phone] :[id number]}  Video-Visit Details    Type of service:  Video Visit   Originating Location (pt. Location): Home  {PROVIDER LOCATION On-site should be selected for visits conducted from your clinic location or adjoining [facility] hospital, academic office, or other nearby [facility] building. Off-site should be selected for all other provider locations, including home:[id number]}  Distant Location (provider location):  On-site  Video visit was switched to a phone call because of the technical issues with video.      NEUROLOGY PROGRESS NOTE   [facility]    Patient:Michele Lezama  : 1970  Age: 54 year old  Today's Virtual Visit: 2025    History of present illness:     Mr. Michele Lezama is participating in this virtual visit for follow-up on his epilepsy and migraine headaches.  He was last seen 2024.    He did not have any seizures since last visit.  He is taking carbamazepine 400 mg 4 times a day and primidone 250 mg 4 times a day.   His last seizure was 2008 due to missing medication.      His headaches are \"very bad\" he says. He says Nurtec once a day doesn't always stop his headaches and he needs to take Advil or ibuprofen.  Maxalt caused drowsiness and dizziness.     Current Outpatient Medications   Medication Sig Dispense Refill     carBAMazepine (TEGRETOL) 200 MG tablet TAKE 2 TABLETS (400 MG) BY MOUTH 4 TIMES DAILY 700 " tablet 3     galcanezumab-gnlm (EMGALITY) 120 MG/ML injection Inject 1 mL (120 mg) subcutaneously every 28 (twenty-eight) days. 3 mL 3     ibuprofen (ADVIL,MOTRIN) 800 MG tablet TAKE 1 TABLET (800 MG) BY MOUTH 3 TIMES DAILY AS NEEDED 90 tablet 3     lisinopril-hydrochlorothiazide (PRINZIDE/ZESTORETIC) 10-12.5 MG tablet Take 1 tablet by mouth daily       multivitamin w/minerals (THERA-VIT-M) tablet Take 1 tablet by mouth daily       primidone (MYSOLINE) 250 MG tablet TAKE 1 TABLET (250 MG) BY MOUTH 4 TIMES DAILY 360 tablet 2     rimegepant (NURTEC) 75 MG ODT tablet Place 1 tablet (75 mg) under the tongue daily as needed for migraine. Maximum of 1 tablet every 24 hours. 8 tablet 5     rizatriptan (MAXALT) 10 MG tablet Take 1 tablet (10 mg) by mouth at onset of headache for migraine May repeat in 2 hours. Max 2 tablets/24 hours. 12 tablet 5     acetaminophen (TYLENOL) 325 MG tablet Take 2 tablets (650 mg) by mouth every 4 hours as needed for other (For optimal non-opioid multimodal pain management to improve pain control.) 30 tablet 0     albuterol (PROAIR HFA/PROVENTIL HFA/VENTOLIN HFA) 108 (90 Base) MCG/ACT inhaler Inhale 2 puffs into the lungs every 6 hours as needed for shortness of breath, wheezing or cough (Patient not taking: Reported on 2/21/2025) 18 g 0     glucosamine-chondroitin 500-400 MG CAPS per capsule Take 2 capsules by mouth daily. 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 Seizures have been controlled for almost 15 years. He had 2ry generalized tonic-clonic seizures in childhood and later had focal impaired-aware seizures. He is taking primidone 250 mg 4 times a day and carbamazepine 400 mg 4 times a day. Levels are therapeutic. Denies side effects.      #2 migraine headaches:  His headaches worsened lately . Overall they got better on Emgality.  I advised him not to take too much OTC pain medications since it can cause  rebound headache. I advised him to take Maxalt as soon as he has an aura, and if had no aura, as soon as headache starts.          - Continue ASDs as before.     - Continue galcanezumab 120 mg subcu every 28 days     -follow up in 1 year    As described above, I talked with the patient for 25 minutes and during this time counseling was greater than 50% of the visit time.    Nerissa Wooten MD      Again, thank you for allowing me to participate in the care of your patient.        Sincerely,        Nerissa Wooten MD    Electronically signed

## 2025-02-21 NOTE — PROGRESS NOTES
"Michele is a 54 year old who is being evaluated via a billable video visit.    How would you like to obtain your AVS? MyChart  If the video visit is dropped, the invitation should be resent by: Text to cell phone: [phone]  Will anyone else be joining your video visit? No    Video-Visit Details    Type of service:  Video Visit   Originating Location (pt. Location): Home    Distant Location (provider location):  On-site  Video visit was switched to a phone call because of the technical issues with video.      NEUROLOGY PROGRESS NOTE   [facility]    Patient:Michele Lezama  : 1970  Age: 54 year old  Today's Virtual Visit: 2025    History of present illness:     Mr. Michele Lezama is participating in this virtual visit for follow-up on his epilepsy and migraine headaches.  He was last seen 2024.    He did not have any seizures since last visit.  He is taking carbamazepine 400 mg 4 times a day and primidone 250 mg 4 times a day.   His last seizure was 2008 due to missing medication.      His headaches are \"very bad\" he says. He says Nurtec once a day doesn't always stop his headaches and he needs to take Advil or ibuprofen.  Maxalt caused drowsiness and dizziness.     Current Outpatient Medications   Medication Sig Dispense Refill    carBAMazepine (TEGRETOL) 200 MG tablet TAKE 2 TABLETS (400 MG) BY MOUTH 4 TIMES DAILY 700 tablet 3    galcanezumab-gnlm (EMGALITY) 120 MG/ML injection Inject 1 mL (120 mg) subcutaneously every 28 (twenty-eight) days. 3 mL 3    ibuprofen (ADVIL,MOTRIN) 800 MG tablet TAKE 1 TABLET (800 MG) BY MOUTH 3 TIMES DAILY AS NEEDED 90 tablet 3    lisinopril-hydrochlorothiazide (PRINZIDE/ZESTORETIC) 10-12.5 MG tablet Take 1 tablet by mouth daily      multivitamin w/minerals (THERA-VIT-M) tablet Take 1 tablet by mouth daily      primidone (MYSOLINE) 250 MG tablet TAKE 1 TABLET (250 MG) BY MOUTH 4 TIMES DAILY 360 tablet 2    rimegepant (NURTEC) 75 MG ODT tablet Place 1 tablet " (75 mg) under the tongue daily as needed for migraine. Maximum of 1 tablet every 24 hours. 8 tablet 5    rizatriptan (MAXALT) 10 MG tablet Take 1 tablet (10 mg) by mouth at onset of headache for migraine May repeat in 2 hours. Max 2 tablets/24 hours. 12 tablet 5    acetaminophen (TYLENOL) 325 MG tablet Take 2 tablets (650 mg) by mouth every 4 hours as needed for other (For optimal non-opioid multimodal pain management to improve pain control.) 30 tablet 0    albuterol (PROAIR HFA/PROVENTIL HFA/VENTOLIN HFA) 108 (90 Base) MCG/ACT inhaler Inhale 2 puffs into the lungs every 6 hours as needed for shortness of breath, wheezing or cough (Patient not taking: Reported on 2/21/2025) 18 g 0    glucosamine-chondroitin 500-400 MG CAPS per capsule Take 2 capsules by mouth daily. 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 Seizures have been controlled for almost 15 years. He had 2ry generalized tonic-clonic seizures in childhood and later had focal impaired-aware seizures. He is taking primidone 250 mg 4 times a day and carbamazepine 400 mg 4 times a day. Levels are therapeutic. Denies side effects.      #2 migraine headaches:  His headaches worsened lately . Overall they got better on Emgality.  I advised him not to take too much OTC pain medications since it can cause rebound headache. I advised him to take Maxalt as soon as he has an aura, and if had no aura, as soon as headache starts.          - Continue ASDs as before.     - Continue galcanezumab 120 mg subcu every 28 days     -follow up in 1 year    As described above, I talked with the patient for 25 minutes and during this time counseling was greater than 50% of the visit time.    Nerissa Wooten MD

## 2025-03-25 ENCOUNTER — MYC MEDICAL ADVICE (OUTPATIENT)
Dept: NEUROLOGY | Facility: CLINIC | Age: 55
End: 2025-03-25
Payer: COMMERCIAL

## 2025-03-27 ENCOUNTER — HOSPITAL ENCOUNTER (EMERGENCY)
Facility: CLINIC | Age: 55
Discharge: HOME | End: 2025-03-27
Attending: EMERGENCY MEDICINE

## 2025-03-27 VITALS
HEART RATE: 89 BPM | DIASTOLIC BLOOD PRESSURE: 76 MMHG | OXYGEN SATURATION: 95 % | TEMPERATURE: 98.1 F | SYSTOLIC BLOOD PRESSURE: 134 MMHG | RESPIRATION RATE: 18 BRPM | HEIGHT: 69 IN | BODY MASS INDEX: 46.65 KG/M2 | WEIGHT: 315 LBS

## 2025-03-27 DIAGNOSIS — S01.01XA SCALP LACERATION, INITIAL ENCOUNTER: ICD-10-CM

## 2025-03-27 PROCEDURE — 12013 RPR F/E/E/N/L/M 2.6-5.0 CM: CPT

## 2025-03-27 PROCEDURE — 99283 EMERGENCY DEPT VISIT LOW MDM: CPT

## 2025-03-27 ASSESSMENT — COLUMBIA-SUICIDE SEVERITY RATING SCALE - C-SSRS
6. HAVE YOU EVER DONE ANYTHING, STARTED TO DO ANYTHING, OR PREPARED TO DO ANYTHING TO END YOUR LIFE?: NO
1. IN THE PAST MONTH, HAVE YOU WISHED YOU WERE DEAD OR WISHED YOU COULD GO TO SLEEP AND NOT WAKE UP?: NO
2. HAVE YOU ACTUALLY HAD ANY THOUGHTS OF KILLING YOURSELF IN THE PAST MONTH?: NO

## 2025-03-27 NOTE — ED TRIAGE NOTES
Pt presents via [facility] after falling forward after tripping on stairs striking right forehead and right shoulder blade. Denies LOC, blood thinner use, or c-spine tenderness. Laceration to right forehead. Bleeding controlled     Triage Assessment (Adult)       Row Name 03/27/25 1333          Triage Assessment    Airway WDL WDL        Respiratory WDL    Respiratory WDL WDL        Skin Circulation/Temperature WDL    Skin Circulation/Temperature WDL WDL        Cardiac WDL    Cardiac WDL WDL        Peripheral/Neurovascular WDL    Peripheral Neurovascular WDL WDL        Cognitive/Neuro/Behavioral WDL    Cognitive/Neuro/Behavioral WDL WDL

## 2025-03-27 NOTE — Clinical Note
Michele Lezama was seen and treated in our emergency department on 3/27/2025.  He may return to work on 03/28/2025.       If you have any questions or concerns, please don't hesitate to call.      Kaitlyn Shabazz MD

## 2025-03-27 NOTE — ED PROVIDER NOTES
EMERGENCY DEPARTMENT ENCOUNTER      NAME: Michele Lezama  AGE: 54 year old male  YOB: 1970  MRN: [id number]  EVALUATION DATE & TIME: No admission date for patient encounter.    PCP: Silvano Lilly    ED PROVIDER: Kaitlyn Shabazz M.D.      Chief Complaint   Patient presents with    Head Injury    Fall         FINAL IMPRESSION:  1. Scalp laceration, initial encounter          ED COURSE & MEDICAL DECISION MAKING:    ED Course as of 03/27/25 1507   Thu Mar 27, 2025   1427 Pt with superficial scalp laceration, [place] head CT negative, NEXUS negative, no other traumatic pathology and well apearing, ambulating. Wound cleaned, per patietn UTD on tetanus, primarily reapproximated with dermabond. Patient discharged after being provided with extensive anticipatory guidance and given return precautions, importance of PMD follow-up emphasized.        Pertinent Labs & Imaging studies reviewed. (See chart for details)    Medical Decision Making  I obtained history from EMS  Discharge. No recommendations on prescription strength medication(s). See documentation for any additional details.    MIPS (CTPE, Dental pain, [place], Sinusitis, Asthma/COPD, Head Trauma): Not Applicable    SEPSIS: None        At the conclusion of the encounter I discussed the results of all of the tests and the disposition. The questions were answered. The patient or family acknowledged understanding and was agreeable with the care plan.     MEDICATIONS GIVEN IN THE EMERGENCY:  Medications - No data to display    NEW PRESCRIPTIONS STARTED AT TODAY'S ER VISIT  Discharge Medication List as of 3/27/2025  2:30 PM             =================================================================    HPI      Michele Lezama is a 54 year old male with PMHx of epilepsy, hypertension on licinopril, migraine, and osteoarthritis who presents to the ED today via EMS with right frontal head laceration from a fall.     Patient reports that at 1245 today he was  walking up the stairs when his shoe caught on a step where he let go of his hands that was holding onto the railing to brace himself, which he couldn't do in time, and he ended up falling forward and hitting the right side of his forehead. His workplace called EMS. Since the fall, his arms and legs have felt normal. He has some back neck tenderness. Patient has otherwise been in their normal state of health and denies any other complaints at this time.       REVIEW OF SYSTEMS   All other systems reviewed and are negative except as noted above in HPI.    PAST MEDICAL HISTORY:  Past Medical History:   Diagnosis Date    Arthritis     GERD (gastroesophageal reflux disease)     GERD (gastroesophageal reflux disease)     Hypertension     Hypertension     Migraine     Migraine     Morbid obesity (H)     Obese     Osteoarthritis     Seizure, petit mal (H) 2008    Seizures (H)     last seizure 2008    Sleep apnea     Sleep apnea        PAST SURGICAL HISTORY:  Past Surgical History:   Procedure Laterality Date    ARTHROPLASTY KNEE Left 7/26/2021    Procedure: LEFT TOTAL KNEE ARTHROPLASTY;  Surgeon: Karri Ceja MD;  Location: [facility] OR    ARTHROSCOPY KNEE      EXAM UNDER ANESTHESIA, MANIPULATE JOINT (LOCATION) Right 7/26/2021    Procedure: RIGHT KNEE MANIPULATION;  Surgeon: Karri Ceja MD;  Location: [facility] OR    EYE SURGERY      HC KNEE SCOPE,SINGLE MENISECTOMY Left 12/13/2016    Procedure: KNEE ARTHROSCOPY WITH MENISECTOMY, LEFT, PARTIAL MEDIAL MENISECTOMY ;  Surgeon: Karri Ceja MD;  Location: [facility];  Service: Orthopedics    OTHER SURGICAL HISTORY      carpel tunnel    TONSILLECTOMY      ZZC TOTAL KNEE ARTHROPLASTY Right 11/4/2020    Procedure: RIGHT TOTAL KNEE ARTHROPLASTY;  Surgeon: Karri Ceja MD;  Location: [facility];  Service: Orthopedics       CURRENT MEDICATIONS:    acetaminophen (TYLENOL) 325 MG tablet  albuterol (PROAIR HFA/PROVENTIL HFA/VENTOLIN HFA) 108 (90  Base) MCG/ACT inhaler  carBAMazepine (TEGRETOL) 200 MG tablet  galcanezumab-gnlm (EMGALITY) 120 MG/ML injection  glucosamine-chondroitin 500-400 MG CAPS per capsule  ibuprofen (ADVIL,MOTRIN) 800 MG tablet  lisinopril-hydrochlorothiazide (PRINZIDE/ZESTORETIC) 10-12.5 MG tablet  multivitamin w/minerals (THERA-VIT-M) tablet  primidone (MYSOLINE) 250 MG tablet  rimegepant (NURTEC) 75 MG ODT tablet  SUMAtriptan (IMITREX) 100 MG tablet  verapamil (CALAN) 80 MG tablet        ALLERGIES:  Allergies   Allergen Reactions    Hydrocodone Unknown    Hydrocodone-Acetaminophen      Interaction with Tegretol       FAMILY HISTORY:  History reviewed. No pertinent family history.    SOCIAL HISTORY:   Social History     Socioeconomic History    Marital status:    Tobacco Use    Smoking status: Never    Smokeless tobacco: Never   Substance and Sexual Activity    Alcohol use: No    Drug use: Never     Social Drivers of Health     Financial Resource Strain: Low Risk  (2/28/2024)    [facility]    Financial Resource Strain     Difficulty of Paying Living Expenses: 3   Food Insecurity: No Food Insecurity (2/28/2024)    [facility]    Food Insecurity     Do you worry your food will run out before you are able to buy more?: 1   Transportation Needs: No Transportation Needs (2/28/2024)    [facility]    Transportation Needs     Does lack of transportation keep you from medical appointments?: 1     Does lack of transportation keep you from work, meetings or getting things that you need?: 1   Social Connections: Socially Integrated (2/28/2024)    [facility]    Social Connections     Do you often feel lonely or isolated from those around you?: 0   Housing Stability: Low Risk  (2/28/2024)    Received from  "[facility]    Housing Stability     What is your housing situation today?: 1       VITALS:  Patient Vitals for the past 24 hrs:   BP Temp Temp src Pulse Resp SpO2 Height Weight   03/27/25 1331 134/76 98.1  F (36.7  C) Temporal 89 18 95 % 1.753 m (5' 9\") (!) 156.5 kg (345 lb)       PHYSICAL EXAM    GENERAL: Awake, alert.  In no acute distress.   HEENT: Right frontal 4 cm superficial linear laceration with no bleeding or foreign body, No hematoma.  Pupils equal, round and reactive.  Conjunctiva normal.  EOMI.  NECK: No stridor or apparent deformity.  EXTREMITIES: No lower extremity swelling or edema.    NEURO: Alert and oriented to person, place and time.  Cranial nerves grossly intact.  No focal motor deficit.  PSYCH: Normal mood and affect  SKIN: No rashes        PROCEDURE:  PROCEDURE: Laceration Repair   INDICATIONS: Laceration   PROCEDURE PROVIDER: Dr Kaitlyn Shabazz   SITE: Right frontal forehead   TYPE/SIZE: simple, clean, and no foreign body visualized  4 cm (total length)   FUNCTIONAL ASSESSMENT: Distal sensation, circulation, motor, and tendon function intact   MEDICATION: No numbing agent   PREPARATION: scrubbing with Normal saline   DEBRIDEMENT: no debridement and wound explored, no foreign body found   CLOSURE:  Superficial layer closed with Dermabond (medical glue)    Total number of sutures/staples placed: 0          I, Raissa Santos, am serving as a scribe to document services personally performed by Dr. Kaitlyn Shabazz based on my observation and the provider's statements to me. Kaitlyn HODGES MD attest that Raissa Santos is acting in a scribe capacity, has observed my performance of the services and has documented them in accordance with my direction.      Kaitlyn Shabazz MD  03/27/25 1507    "

## 2025-04-09 DIAGNOSIS — G43.119 INTRACTABLE MIGRAINE WITH AURA WITHOUT STATUS MIGRAINOSUS: Primary | ICD-10-CM

## 2025-04-14 ENCOUNTER — MYC MEDICAL ADVICE (OUTPATIENT)
Dept: NEUROLOGY | Facility: CLINIC | Age: 55
End: 2025-04-14
Payer: COMMERCIAL

## 2025-04-14 DIAGNOSIS — R51.9 RIGHT-SIDED HEADACHE: Primary | ICD-10-CM

## 2025-04-15 RX ORDER — NAPROXEN 500 MG/1
500 TABLET ORAL 2 TIMES DAILY WITH MEALS
Qty: 14 TABLET | Refills: 0 | Status: SHIPPED | OUTPATIENT
Start: 2025-04-15

## 2025-04-15 RX ORDER — GABAPENTIN 100 MG/1
CAPSULE ORAL
Qty: 360 CAPSULE | Refills: 3 | Status: SHIPPED | OUTPATIENT
Start: 2025-04-15

## 2025-04-16 ENCOUNTER — TRANSCRIBE ORDERS (OUTPATIENT)
Dept: OTHER | Age: 55
End: 2025-04-16

## 2025-04-16 DIAGNOSIS — G50.0 RIGHT TRIGEMINAL NEURALGIA: Primary | ICD-10-CM

## 2025-04-28 ENCOUNTER — MYC MEDICAL ADVICE (OUTPATIENT)
Dept: NEUROLOGY | Facility: CLINIC | Age: 55
End: 2025-04-28
Payer: COMMERCIAL

## 2025-04-28 ENCOUNTER — TELEPHONE (OUTPATIENT)
Dept: NEUROSURGERY | Facility: CLINIC | Age: 55
End: 2025-04-28
Payer: COMMERCIAL

## 2025-04-28 NOTE — TELEPHONE ENCOUNTER
[facility] CLINICAL DOCUMENTATION    Form Documentation Form or Letter Request    Type or form/letter needing completion: ESTEVAN  Provider: Amber  Has provider seen patient for office visit related to reason for form request? Yes  Date form needed: ASAP  Once completed: Fax form to: Tammy at [phone] original sent to patient copy sent to scanning

## 2025-05-06 ENCOUNTER — MYC MEDICAL ADVICE (OUTPATIENT)
Dept: NEUROLOGY | Facility: CLINIC | Age: 55
End: 2025-05-06
Payer: COMMERCIAL

## 2025-05-06 NOTE — LETTER
May 6, 2025      Michele Lezama  [address] [address] [facility] [address]        To Whom It May Concern:    Michele Lezama is a patient of mine. Please excuse him from work on 5/5/25 due to a medical condition. He may return to work on 05/6/25 without restrictions.         Sincerely,        Nerissa Wooten MD    Electronically signed

## 2025-05-06 NOTE — TELEPHONE ENCOUNTER
A work excuse letter has been sent to patient via [employer].    JOVI WaldropN RN Care Coordinator  Neurology/Neurosurgery/PM&R/Pain Management

## 2025-05-07 ENCOUNTER — OFFICE VISIT (OUTPATIENT)
Dept: NEUROSURGERY | Facility: CLINIC | Age: 55
End: 2025-05-07
Attending: FAMILY MEDICINE
Payer: COMMERCIAL

## 2025-05-07 VITALS
SYSTOLIC BLOOD PRESSURE: 113 MMHG | WEIGHT: 315 LBS | RESPIRATION RATE: 16 BRPM | DIASTOLIC BLOOD PRESSURE: 77 MMHG | HEIGHT: 69 IN | OXYGEN SATURATION: 96 % | HEART RATE: 89 BPM | BODY MASS INDEX: 46.65 KG/M2

## 2025-05-07 DIAGNOSIS — G50.0 RIGHT TRIGEMINAL NEURALGIA: ICD-10-CM

## 2025-05-07 NOTE — PATIENT INSTRUCTIONS
Check carbamazepine level.    2. Continue current medications.       Please call any of these clinics below to have an evaluation for TMJ:  Rolanda Whitting[place]  [facility] [place]  [place]ments: [phone]    2. Blanche Cardozo at [facility]  [email or web address]  Appointments: [phone]    3. [facility]  [email or web address]    4. [facility]

## 2025-05-07 NOTE — PROGRESS NOTES
[facility]  Department of Neurosurgery      Name: Michele Lezama  MRN: [id number]  Age: 54 year old  : 1970  Referring provider: Silvano Lilly  2025      Chief Complaint:   Right facial pain  New patient    History of Present Illness:   Michele Lezama is a 54 year old male with a history of seizures since childhood, currently under control, migraines, ALBERTO, who is here today for recent onset of right sided facial pain. Today patient presents for the visit independently.     On 3/27/2025, patient had a fall after his shoes got caught on a step. He fell forward and hit his head. He was seen at [facility]. He had a laceration on his right forehead. Wound was cleaned and re approximated with Dermabond. He did not have any imaging at that time.    On , patient started to have right ear ache, and was seen in  after a few days. He was treated with a course of antibiotics for possible ear infection. Then he started to experience a sharp, shooting pain in his right forehead and right cheek areas. The pain was constant with 10/10 intensity. He also has an aching, dull pain in the right TMJ areas. He was seen at [facility]. Possible diagnosis of trigeminal neuralgia was discussed and he was started on gabapentin. Patient has been on Carbamazepine 400 mg QID for many years for his diagnosis of seizures. He was also given Percocet.     Patient had severe cognitive fogging as a side effect from Gabapentin and he gradually reduced the dose and stopped this medication. He remains on carbamazepine 1600 mg a day. He is also on Mysoline 250 mg 4 times a day for seizures.     Today patient reports that his most recent shooting pain was a few days ago. No clear triggers for his pain. Currently his most bothersome symptom is constant, dull, aching pain in right cheek and right temple areas. Ice pack and Ibuprofen has been effective for this pain. Patient reports a habit of  grinding his teeth and had recommended a mouth guard in the past. He is not using this currently. No clicking sound in TMJ area with mouth movements. No family or personal h/o Multiple Sclerosis.     Patient also has a h/o migraine. He reports right temporal and right eye pain when he has a migraine. He is currently on Nurtec and Emgality for migraine management.     He is currently on carbamazepine 1600 mg a day. He was seen by Dr. Clark recently. Increasing the medication dose in the setting of recent facial pain was recommended. Patient is hesitant to make any medication changes at this time as his seizures been well controlled on current medications. His most recent seizure was in 2008 when he missed a medication dose.     Patient had a brain MRI after symptom onset which ruled out neurovascular compression.     Review of Systems:   Pertinent items are noted in HPI or as in patient entered ROS below, remainder of complete ROS is negative.        No data to display                 Active Medications:     Current Outpatient Medications:     albuterol (PROAIR HFA/PROVENTIL HFA/VENTOLIN HFA) 108 (90 Base) MCG/ACT inhaler, Inhale 2 puffs into the lungs every 6 hours as needed for shortness of breath, wheezing or cough, Disp: 18 g, Rfl: 0    carBAMazepine (TEGRETOL) 200 MG tablet, TAKE 2 TABLETS (400 MG) BY MOUTH 4 TIMES DAILY, Disp: 700 tablet, Rfl: 3    gabapentin (NEURONTIN) 100 MG capsule, Start taking 100 mg three times a day and increase every 3 days by 300 mg/d to 400 mg three times a day or as tolerated. (Patient taking differently: Take 100 mg by mouth 3 times daily. Start taking 100 mg three times a day and increase every 3 days by 300 mg/d to 400 mg three times a day or as tolerated.), Disp: 360 capsule, Rfl: 3    galcanezumab-gnlm (EMGALITY) 120 MG/ML injection, Inject 1 mL (120 mg) subcutaneously every 28 (twenty-eight) days., Disp: 3 mL, Rfl: 3    glucosamine-chondroitin 500-400 MG CAPS per capsule, Take  2 capsules by mouth daily., Disp: , Rfl:     ibuprofen (ADVIL,MOTRIN) 800 MG tablet, TAKE 1 TABLET (800 MG) BY MOUTH 3 TIMES DAILY AS NEEDED, Disp: 90 tablet, Rfl: 3    lisinopril-hydrochlorothiazide (PRINZIDE/ZESTORETIC) 10-12.5 MG tablet, Take 1 tablet by mouth daily, Disp: , Rfl:     multivitamin w/minerals (THERA-VIT-M) tablet, Take 1 tablet by mouth daily, Disp: , Rfl:     naproxen (NAPROSYN) 500 MG tablet, Take 1 tablet (500 mg) by mouth 2 times daily (with meals)., Disp: 14 tablet, Rfl: 0    primidone (MYSOLINE) 250 MG tablet, TAKE 1 TABLET (250 MG) BY MOUTH 4 TIMES DAILY, Disp: 360 tablet, Rfl: 3    rimegepant (NURTEC) 75 MG ODT tablet, Place 1 tablet (75 mg) under the tongue every 48 hours., Disp: 16 tablet, Rfl: 5    rimegepant (NURTEC) 75 MG ODT tablet, Place 1 tablet (75 mg) under the tongue daily as needed for migraine. Maximum of 1 tablet every 24 hours., Disp: 8 tablet, Rfl: 5    verapamil (CALAN) 80 MG tablet, Take 1 tablet daily for 1 week, then increase to 1 tablet twice a day, Disp: 180 tablet, Rfl: 1    acetaminophen (TYLENOL) 325 MG tablet, Take 2 tablets (650 mg) by mouth every 4 hours as needed for other (For optimal non-opioid multimodal pain management to improve pain control.), Disp: 30 tablet, Rfl: 0    SUMAtriptan (IMITREX) 100 MG tablet, Take 1 tablet (100 mg) by mouth at onset of headache for migraine. May repeat once in 24 hrs, Disp: 18 tablet, Rfl: 5      Allergies:   Hydrocodone and Hydrocodone-acetaminophen      Past Medical History:  Past Medical History:   Diagnosis Date    Arthritis     GERD (gastroesophageal reflux disease)     GERD (gastroesophageal reflux disease)     Hypertension     Hypertension     Migraine     Migraine     Morbid obesity (H)     Obese     Osteoarthritis     Seizure, petit mal (H) 2008    Seizures (H)     last seizure 2008    Sleep apnea     Sleep apnea      Patient Active Problem List   Diagnosis    Localization-related epilepsy (H)    Morbid obesity (H)  "   Dizziness    History of total knee arthroplasty, left    BMI 45.0-49.9, adult (H)    Essential hypertension    Status post knee surgery        Past Surgical History:  Past Surgical History:   Procedure Laterality Date    ARTHROPLASTY KNEE Left 2021    Procedure: LEFT TOTAL KNEE ARTHROPLASTY;  Surgeon: Karri Ceja MD;  Location: [facility] Main OR    ARTHROSCOPY KNEE      EXAM UNDER ANESTHESIA, MANIPULATE JOINT (LOCATION) Right 2021    Procedure: RIGHT KNEE MANIPULATION;  Surgeon: Karri Ceja MD;  Location: [facility] OR    EYE SURGERY      HC KNEE SCOPE,SINGLE MENISECTOMY Left 2016    Procedure: KNEE ARTHROSCOPY WITH MENISECTOMY, LEFT, PARTIAL MEDIAL MENISECTOMY ;  Surgeon: Karri Ceja MD;  Location: [facility] Main OR;  Service: Orthopedics    OTHER SURGICAL HISTORY      carpel tunnel    TONSILLECTOMY      ZZC TOTAL KNEE ARTHROPLASTY Right 2020    Procedure: RIGHT TOTAL KNEE ARTHROPLASTY;  Surgeon: Karri Ceja MD;  Location: [facility] Main OR;  Service: Orthopedics       Family History:   No family history on file.      Social History:   Social History     Tobacco Use    Smoking status: Never    Smokeless tobacco: Never   Substance Use Topics    Alcohol use: No    Drug use: Never        Physical Exam:   /77 (BP Location: Right arm, Patient Position: Sitting)   Pulse 89   Resp 16   Ht 1.753 m (5' 9\")   Wt (!) 156.5 kg (345 lb)   SpO2 96%   BMI 50.95 kg/m     General: No acute distress.   Neuro: The patient is fully oriented. Speech is normal. Gait is normal with normal heel-toe walking.   Psych: Normal mood and affect. Behavior is normal.      Imagin2025 MRI Brain:  Impression    1. No acute intracranial abnormality.  2. Normal brain parenchymal morphology. Scattered nonspecific foci of T2 signal within the white matter may represent chronic deep white matter small vessel ischemic changes or sequela of migraine headache.  3. No abnormal enhancement " or enhancing lesions within the brain parenchyma.  4. Dedicated sequences of the skull base and IAC`s demonstrates normal course of cranial nerves. No abnormal mass or enhancement. Specifically, no impinging vascular loop about the trigeminal nerves. Normal cavernous sinuses.     Assessment:  Right facial pain, complex  New patient    Plan:  54 year old male presenting with Right facial pain which has components of both Trigeminal neuralgia (sharp, shooting pain) and possible TMJ (constant, dull aching pain). It is possible that carbamazepine (has been on 1600 mg daily for seizure management) is completely controlling the trigeminal neuralgia. Gabapentin was effective, but patient reports significant cognitive fogging as a side effect which limited him from working. He works in a [employer]. He is not interested in restarting this medication.     We discussed further evaluation for TMJ. I provided him with some clinic options. Patient will send me an update via [employer] once this evaluation is completed. If TMJ is ruled out, we will arrange a visit with Dr. Villaseñor to discuss possible surgical options as patient is hesitant to add more medications to his current medication regimen.        Jennifer Baumann CNP  Department of Neurosurgery    I spent 42 minutes on patient care activities related to this encounter on the date of service, including time spent reviewing the chart, obtaining history and examination and in counseling the patient, and in documentation in the electronic medical record.

## 2025-05-12 ENCOUNTER — TELEPHONE (OUTPATIENT)
Dept: NEUROLOGY | Facility: CLINIC | Age: 55
End: 2025-05-12
Payer: COMMERCIAL

## 2025-05-12 NOTE — TELEPHONE ENCOUNTER
Left Voicemail (1st Attempt) and Sent Mychart (1st Attempt) for the patient to call back and schedule the following:    Appointment type: Return Neuro  Provider: Dr. Clark  Return date: 10/25/2025  Specialty phone number: [phone]  Additional appointment(s) needed:   Additonal Notes:     Attempted to schedule a return Neuro appt with Dr. Clark in Oct 2025.    Also sent a [employer] message.    Marta MILES/Monae Procedure    [facility]   Neurology, NeuroSurgery, NeuroPsychology, Pain Management and Cardiology Specialties  Medical/Surgical Adult Specialties

## 2025-05-15 NOTE — TELEPHONE ENCOUNTER
Left Voicemail (2nd Attempt) and Sent Mychart (2nd Attempt) for the patient to call back and schedule the following:    Appointment type: Return Neurology  Provider: Dr. Clark  Return date: 10/25/2025  Specialty phone number: [phone]  Additional appointment(s) needed:   Additonal Notes:     2nd attempt to schedule a return Neuro appointment with Dr. Clark.    Also sent 2nd MyChart message, patient did read the first MyChart message.    Marta MILES/Monae Procedure    [facility]   Neurology, NeuroSurgery, NeuroPsychology, Pain Management and Cardiology Specialties  Medical/Surgical Adult Specialties

## 2025-05-20 ENCOUNTER — TRANSFERRED RECORDS (OUTPATIENT)
Dept: HEALTH INFORMATION MANAGEMENT | Facility: CLINIC | Age: 55
End: 2025-05-20
Payer: COMMERCIAL

## 2025-05-21 ENCOUNTER — DOCUMENTATION ONLY (OUTPATIENT)
Dept: NEUROSURGERY | Facility: CLINIC | Age: 55
End: 2025-05-21
Payer: COMMERCIAL

## 2025-05-21 NOTE — PROGRESS NOTES
Neuroscience Clinic Task Note    TASK    Return Records - Neurosurgery  Records received from: [facility]   Reason for visit: consultation   Provider:    Date of appt: 5-20-25   NOTES  (For all visits) STATUS DETAILS   OFFICE NOTE   from referring provider     OFFICE NOTE   from other specialist Received    DISCHARGE SUMMARY   from hospital     DISCHARGE REPORT   from ER     Operative Report     EMG Report     Medication List     IMAGING  (For all visits)     MRI (head, neck, spine)     XRAY (spine)   *NEUROSURGERY*     CT (head, neck, spine)         FOLLOW-UP      ADDITIONAL COMMENTS  Report sent to HIM    Bina Hilario CMA

## 2025-05-22 ENCOUNTER — MYC MEDICAL ADVICE (OUTPATIENT)
Dept: NEUROSURGERY | Facility: CLINIC | Age: 55
End: 2025-05-22
Payer: COMMERCIAL

## 2025-05-27 ENCOUNTER — TELEPHONE (OUTPATIENT)
Dept: NEUROSURGERY | Facility: CLINIC | Age: 55
End: 2025-05-27
Payer: COMMERCIAL

## 2025-05-27 NOTE — TELEPHONE ENCOUNTER
Called patient and scheduled appointment w/ Dr. Villaseñor per Jennifer Baumann via [employer] message. -KB

## 2025-05-27 NOTE — TELEPHONE ENCOUNTER
Records Requested   May 27, 2025 3:12 PM   [id number]   Facility  [facility]   Outcome 3:14 pm Sent request for imaging to be pushed to PACS. -RICKY     REASON FOR VISIT: Discuss possible surgical options ([place])   PROVIDER: Brandon Villaseñor MD   DATE OF APPT: 7/23/25 @ 3:00 pm    NOTES (FOR ALL VISITS) STATUS DETAILS   OFFICE NOTE from referring provider Internal 5/7/25 Jennifer Baumann APRN CNP @[facility]-NeuroSurg     OFFICE NOTE from other specialist Care Everywhere 5/20/25 LUI CUELLAR DDS,MS @[facility]    4/14/25 (Phone Note) Silv[facility] Lilly [facility]  [email or web address]     4/12/25, 4/10/25 Carline Mccall PA  @[email or web address]      2/21/25, 11/8/24, 3/8/24 Nerissa Wooten MD @[facility] Neuro     HOSPITAL ENCOUNTERS Care Everywhere 4/12/25 Ran Viera MD  @[facility] ED     3/27/25 Kaitlyn Shabazz MD @[facility] ED     2/4/23 Mckay Roberto MD @[facility] ED     MEDICATION LIST Internal    IMAGING  (FOR ALL VISITS)     MRI (HEAD, NECK, SPINE) In process [facility]*  4/19/25 MR Head Brain     CT (HEAD, NECK, SPINE) Internal [facility]  2/21/25 CT Head  2/4/23 CT Head  2/4/23 CT Cervical Spine     [place]  10/14/21 CT Head

## 2025-06-10 ENCOUNTER — MYC MEDICAL ADVICE (OUTPATIENT)
Dept: NEUROSURGERY | Facility: CLINIC | Age: 55
End: 2025-06-10
Payer: COMMERCIAL

## 2025-06-29 ENCOUNTER — MYC MEDICAL ADVICE (OUTPATIENT)
Dept: NEUROLOGY | Facility: CLINIC | Age: 55
End: 2025-06-29
Payer: COMMERCIAL

## 2025-07-13 ENCOUNTER — HEALTH MAINTENANCE LETTER (OUTPATIENT)
Age: 55
End: 2025-07-13

## 2025-07-23 ENCOUNTER — PRE VISIT (OUTPATIENT)
Dept: NEUROSURGERY | Facility: CLINIC | Age: 55
End: 2025-07-23

## 2025-08-13 ENCOUNTER — MYC MEDICAL ADVICE (OUTPATIENT)
Dept: NEUROLOGY | Facility: CLINIC | Age: 55
End: 2025-08-13
Payer: COMMERCIAL

## 2025-08-20 ENCOUNTER — TELEPHONE (OUTPATIENT)
Dept: PHYSICAL MEDICINE AND REHAB | Facility: CLINIC | Age: 55
End: 2025-08-20

## 2025-08-21 ENCOUNTER — PRE VISIT (OUTPATIENT)
Dept: NEUROSURGERY | Facility: CLINIC | Age: 55
End: 2025-08-21

## (undated) DEVICE — PLATE GROUNDING ADULT W/CORD 9165L

## (undated) DEVICE — HOOD FLYTE SURGICOOL

## (undated) DEVICE — SOL NACL 0.9% IRRIG 1000ML BOTTLE 2F7124

## (undated) DEVICE — SU MONOCRYL 3-0 PS-2 18" UND MCP497G

## (undated) DEVICE — CUFF TOURN 34IN STRL DISP

## (undated) DEVICE — GLOVE BIOGEL PI SZ 7.0 40870

## (undated) DEVICE — SUTURE VICRYL+ 1 27IN CT-1 UND VCP261H

## (undated) DEVICE — SUTURE VICRYL+ 2-0 27IN CT-1 UND VCP259H

## (undated) DEVICE — GOWN IMPERVIOUS BREATHABLE SMART XLG 89045

## (undated) DEVICE — DRSG ABD TNDRSRB WET PRUF 8IN X 10IN STRL  9194A

## (undated) DEVICE — SOL WATER IRRIG 1000ML BOTTLE 2F7114

## (undated) DEVICE — BLADE SAW SAGITTAL STRK DUAL CUT 4118-135-090

## (undated) DEVICE — SOL NACL 0.9% INJ 1000ML BAG 2B1324X

## (undated) DEVICE — SU STRATAFIX PDS PLUS 1 CT-1 18" SXPP1A404

## (undated) DEVICE — GLOVE SURG PI ULTRA TOUCH M SZ 7-1/2 LF

## (undated) DEVICE — ADH SKIN CLOSURE PREMIERPRO EXOFIN 1.0ML 3470

## (undated) DEVICE — DECANTER VIAL 2006S

## (undated) DEVICE — SUCTION MANIFOLD NEPTUNE 2 SYS 4 PORT 0702-020-000

## (undated) DEVICE — GOWN LG DISP 9515

## (undated) DEVICE — HOLDER LIMB VELCRO OR 0814-1533

## (undated) DEVICE — DRSG GAUZE 4X4" 3033

## (undated) DEVICE — GLOVE UNDER INDICATOR PI SZ 7.0 LF 41670

## (undated) DEVICE — GLOVE BIOGEL INDICATOR 7.5 LF 41675

## (undated) DEVICE — CUSTOM PACK TOTAL KNEE ACCESSORY SOP5BTAHEA

## (undated) DEVICE — CUSTOM PACK TOTAL KNEE SOP5BTKHEC

## (undated) DEVICE — DRESSING MEPILEX BORDER POST-OP 4X12